# Patient Record
Sex: MALE | Race: BLACK OR AFRICAN AMERICAN | Employment: OTHER | URBAN - METROPOLITAN AREA
[De-identification: names, ages, dates, MRNs, and addresses within clinical notes are randomized per-mention and may not be internally consistent; named-entity substitution may affect disease eponyms.]

---

## 2021-05-19 ENCOUNTER — OFFICE VISIT (OUTPATIENT)
Dept: INTERNAL MEDICINE CLINIC | Facility: CLINIC | Age: 71
End: 2021-05-19
Payer: MEDICARE

## 2021-05-19 VITALS
TEMPERATURE: 97 F | DIASTOLIC BLOOD PRESSURE: 78 MMHG | SYSTOLIC BLOOD PRESSURE: 136 MMHG | BODY MASS INDEX: 21.66 KG/M2 | WEIGHT: 138 LBS | HEART RATE: 68 BPM | HEIGHT: 67 IN

## 2021-05-19 DIAGNOSIS — Z13.29 SCREENING FOR THYROID DISORDER: ICD-10-CM

## 2021-05-19 DIAGNOSIS — Z13.0 SCREENING FOR BLOOD DISEASE: ICD-10-CM

## 2021-05-19 DIAGNOSIS — F17.200 TOBACCO DEPENDENCE: ICD-10-CM

## 2021-05-19 DIAGNOSIS — Z13.228 SCREENING FOR METABOLIC DISORDER: ICD-10-CM

## 2021-05-19 DIAGNOSIS — Z00.00 ENCOUNTER FOR ANNUAL HEALTH EXAMINATION: Primary | ICD-10-CM

## 2021-05-19 DIAGNOSIS — Z12.5 SCREENING FOR PROSTATE CANCER: ICD-10-CM

## 2021-05-19 DIAGNOSIS — Z12.11 SCREEN FOR COLON CANCER: ICD-10-CM

## 2021-05-19 DIAGNOSIS — Z13.220 SCREENING FOR LIPID DISORDERS: ICD-10-CM

## 2021-05-19 PROCEDURE — 99397 PER PM REEVAL EST PAT 65+ YR: CPT | Performed by: INTERNAL MEDICINE

## 2021-05-19 PROCEDURE — 3078F DIAST BP <80 MM HG: CPT | Performed by: INTERNAL MEDICINE

## 2021-05-19 PROCEDURE — 3008F BODY MASS INDEX DOCD: CPT | Performed by: INTERNAL MEDICINE

## 2021-05-19 PROCEDURE — G0438 PPPS, INITIAL VISIT: HCPCS | Performed by: INTERNAL MEDICINE

## 2021-05-19 PROCEDURE — 96160 PT-FOCUSED HLTH RISK ASSMT: CPT | Performed by: INTERNAL MEDICINE

## 2021-05-19 PROCEDURE — 90732 PPSV23 VACC 2 YRS+ SUBQ/IM: CPT | Performed by: INTERNAL MEDICINE

## 2021-05-19 PROCEDURE — G0009 ADMIN PNEUMOCOCCAL VACCINE: HCPCS | Performed by: INTERNAL MEDICINE

## 2021-05-19 PROCEDURE — 3075F SYST BP GE 130 - 139MM HG: CPT | Performed by: INTERNAL MEDICINE

## 2021-05-19 RX ORDER — ASPIRIN 81 MG
TABLET, DELAYED RELEASE (ENTERIC COATED) ORAL
COMMUNITY

## 2021-05-19 RX ORDER — ASPIRIN 81 MG/1
81 TABLET ORAL DAILY
COMMUNITY

## 2021-05-19 NOTE — PATIENT INSTRUCTIONS
Lawrence Patel's SCREENING SCHEDULE   Tests on this list are recommended by your physician but may not be covered, or covered at this frequency, by your insurer. Please check with your insurance carrier before scheduling to verify coverage.     Patrick Kawasaki or any previous visit. No flowsheet data found. Fecal Occult Blood   Covered Annually No results found for: FOB, OCCULTSTOOL No flowsheet data found.      Barium Enema-   uncomfortable but covered  Covered but uncomfortable   Glaucoma Screening      Oph prescription benefits     Recommended Websites for Advanced Directives    [email or web address] org/publications/[email or web address]. pdf  An information packet, including necessary form from the [employer] website. http://www. idph.sta

## 2021-05-19 NOTE — PROGRESS NOTES
HPI:   Amarilis Jarrell is a 72year old male who presents for a Medicare Initial Annual Wellness visit (Once after 12 month Medicare anniversary) . The patient has been in his usual state of health. No acute concerns.      He has not undergone a forma CHOLEST, HDL, LDL, TRIG       Last Chemistry Labs:   No results found for: AST, ALT, CA, ALB, TSH, CREATSERUM, GLU     CBC  (most recent labs)   No results found for: WBC, HGB, PLT     ALLERGIES:   He has No Known Allergies.     CURRENT MEDICATIONS:   aspir stated age   Head:  Normocephalic, without obvious abnormality, atraumatic   Eyes:  Bilateral conjunctiva wnl   Ears:  Normal TM's and external ear canals, both ears   Nose: Deferred   Throat: Deferred   Neck: Supple, symmetrical, trachea midline, no adeno Future  -     COMP METABOLIC PANEL (14); Future  -     LIPID PANEL;  Future  -     TSH W REFLEX TO FREE T4; Future  -     PSA SCREEN; Future    Screen for colon cancer  -     GASTRO - INTERNAL    Tobacco dependence    Screening for blood disease  -     CBC if applicable    Flex Sigmoidoscopy Screen every 10 years No results found for this or any previous visit. No flowsheet data found. Fecal Occult Blood Annually No results found for: FOB No flowsheet data found.     Glaucoma Screening      Ophthalmology

## 2021-06-23 PROBLEM — R19.7 DIARRHEA: Status: ACTIVE | Noted: 2021-06-23

## 2021-06-24 ENCOUNTER — LAB ENCOUNTER (OUTPATIENT)
Dept: LAB | Age: 71
End: 2021-06-24
Attending: INTERNAL MEDICINE
Payer: MEDICARE

## 2021-06-24 DIAGNOSIS — Z13.228 SCREENING FOR METABOLIC DISORDER: ICD-10-CM

## 2021-06-24 DIAGNOSIS — Z13.29 SCREENING FOR THYROID DISORDER: ICD-10-CM

## 2021-06-24 DIAGNOSIS — Z12.5 SCREENING FOR PROSTATE CANCER: ICD-10-CM

## 2021-06-24 DIAGNOSIS — Z13.220 SCREENING FOR LIPID DISORDERS: ICD-10-CM

## 2021-06-24 DIAGNOSIS — R74.8 ELEVATED ALKALINE PHOSPHATASE LEVEL: ICD-10-CM

## 2021-06-24 DIAGNOSIS — R19.7 DIARRHEA, UNSPECIFIED TYPE: ICD-10-CM

## 2021-06-24 DIAGNOSIS — Z00.00 ENCOUNTER FOR ANNUAL HEALTH EXAMINATION: ICD-10-CM

## 2021-06-24 DIAGNOSIS — Z13.0 SCREENING FOR BLOOD DISEASE: ICD-10-CM

## 2021-06-24 PROCEDURE — 84075 ASSAY ALKALINE PHOSPHATASE: CPT

## 2021-06-24 PROCEDURE — 80053 COMPREHEN METABOLIC PANEL: CPT

## 2021-06-24 PROCEDURE — 83516 IMMUNOASSAY NONANTIBODY: CPT

## 2021-06-24 PROCEDURE — 84080 ASSAY ALKALINE PHOSPHATASES: CPT

## 2021-06-24 PROCEDURE — 85025 COMPLETE CBC W/AUTO DIFF WBC: CPT

## 2021-06-24 PROCEDURE — 84443 ASSAY THYROID STIM HORMONE: CPT

## 2021-06-24 PROCEDURE — 82784 ASSAY IGA/IGD/IGG/IGM EACH: CPT

## 2021-06-24 PROCEDURE — 36415 COLL VENOUS BLD VENIPUNCTURE: CPT

## 2021-06-24 PROCEDURE — 80061 LIPID PANEL: CPT

## 2021-07-16 ENCOUNTER — ANESTHESIA (OUTPATIENT)
Dept: ENDOSCOPY | Facility: HOSPITAL | Age: 71
End: 2021-07-16
Payer: MEDICARE

## 2021-07-16 ENCOUNTER — HOSPITAL ENCOUNTER (OUTPATIENT)
Facility: HOSPITAL | Age: 71
Setting detail: HOSPITAL OUTPATIENT SURGERY
Discharge: HOME | End: 2021-07-16
Attending: INTERNAL MEDICINE | Admitting: INTERNAL MEDICINE
Payer: MEDICARE

## 2021-07-16 ENCOUNTER — ANESTHESIA EVENT (OUTPATIENT)
Dept: ENDOSCOPY | Facility: HOSPITAL | Age: 71
End: 2021-07-16
Payer: MEDICARE

## 2021-07-16 VITALS
HEART RATE: 51 BPM | RESPIRATION RATE: 18 BRPM | TEMPERATURE: 97 F | OXYGEN SATURATION: 99 % | HEIGHT: 67 IN | SYSTOLIC BLOOD PRESSURE: 121 MMHG | WEIGHT: 132 LBS | BODY MASS INDEX: 20.72 KG/M2 | DIASTOLIC BLOOD PRESSURE: 63 MMHG

## 2021-07-16 DIAGNOSIS — R19.7 DIARRHEA, UNSPECIFIED TYPE: ICD-10-CM

## 2021-07-16 PROCEDURE — 88305 TISSUE EXAM BY PATHOLOGIST: CPT | Performed by: INTERNAL MEDICINE

## 2021-07-16 PROCEDURE — 0DBE8ZX EXCISION OF LARGE INTESTINE, VIA NATURAL OR ARTIFICIAL OPENING ENDOSCOPIC, DIAGNOSTIC: ICD-10-PCS | Performed by: INTERNAL MEDICINE

## 2021-07-16 RX ORDER — SODIUM CHLORIDE, SODIUM LACTATE, POTASSIUM CHLORIDE, CALCIUM CHLORIDE 600; 310; 30; 20 MG/100ML; MG/100ML; MG/100ML; MG/100ML
INJECTION, SOLUTION INTRAVENOUS CONTINUOUS
Status: DISCONTINUED | OUTPATIENT
Start: 2021-07-16 | End: 2021-07-16

## 2021-07-16 RX ORDER — LIDOCAINE HYDROCHLORIDE 10 MG/ML
INJECTION, SOLUTION EPIDURAL; INFILTRATION; INTRACAUDAL; PERINEURAL AS NEEDED
Status: DISCONTINUED | OUTPATIENT
Start: 2021-07-16 | End: 2021-07-16 | Stop reason: SURG

## 2021-07-16 RX ADMIN — LIDOCAINE HYDROCHLORIDE 50 MG: 10 INJECTION, SOLUTION EPIDURAL; INFILTRATION; INTRACAUDAL; PERINEURAL at 09:59:00

## 2021-07-16 RX ADMIN — SODIUM CHLORIDE, SODIUM LACTATE, POTASSIUM CHLORIDE, CALCIUM CHLORIDE: 600; 310; 30; 20 INJECTION, SOLUTION INTRAVENOUS at 10:11:00

## 2021-07-16 RX ADMIN — SODIUM CHLORIDE, SODIUM LACTATE, POTASSIUM CHLORIDE, CALCIUM CHLORIDE: 600; 310; 30; 20 INJECTION, SOLUTION INTRAVENOUS at 09:57:00

## 2021-07-16 NOTE — ANESTHESIA POSTPROCEDURE EVALUATION
[address] [place] [address] Patient Status:  Hospital Outpatient Surgery   Age/Gender 72year old male MRN [id number]   Location [id number] [place] [address] [facility] Day # 0 PCP Elenita Cardoso MD       Anesthesia

## 2021-07-16 NOTE — OPERATIVE REPORT
Merritt Hidalgo Patient Status:  Hospital Outpatient Surgery    1950 MRN [id number]   Location [id number] [place] [address] Attending Lisa Bills,    Hosp Day # 0 PCP Malgorzata George MD       PREOPERATIVE DIAGNOSIS/INDICATION: were normal.  Transverse colon: The mucosa and vascular pattern were normal.  Descending colon: The mucosa and vascular pattern were normal.  Sigmoid colon: The mucosa and vascular pattern were normal.  Rectum:  The mucosa and vascular pattern were normal.

## 2021-07-16 NOTE — H&P
History & Physical Examination    Patient Name: Niki Higginbotham  MRN: [id number]  [facility]: [id number]  YOB: 1950    Diagnosis: chronic diarrhea    Present Illness: 71 y/o M history as above presents for Colonoscopy.      Omega-3 Fatty Acids (FISH

## 2021-07-16 NOTE — ANESTHESIA PREPROCEDURE EVALUATION
PRE-OP EVALUATION    Patient Name: Sarita Pappas    Admit Diagnosis: Diarrhea, unspecified type [R19.7]    Pre-op Diagnosis: Diarrhea, unspecified type [R19.7]    COLONOSCOPY    Anesthesia Procedure: COLONOSCOPY (N/A )    Surgeon(s) and Role:     * marshal standard drinks      Types: 2 Standard drinks or equivalent per week      Drug use:    Types: Cannabis     Available pre-op labs reviewed.   Lab Results   Component Value Date    WBC 8.5 06/24/2021    RBC 4.37 06/24/2021    HGB 12.4 (L) 06/24/2021    HCT 40

## 2021-07-26 ENCOUNTER — ANCILLARY ORDERS (OUTPATIENT)
Dept: INTERNAL MEDICINE CLINIC | Facility: CLINIC | Age: 71
End: 2021-07-26

## 2021-07-26 ENCOUNTER — HOSPITAL ENCOUNTER (OUTPATIENT)
Dept: ULTRASOUND IMAGING | Age: 71
Discharge: HOME | End: 2021-07-26
Attending: INTERNAL MEDICINE
Payer: MEDICARE

## 2021-07-26 DIAGNOSIS — R74.8 ELEVATED ALKALINE PHOSPHATASE LEVEL: ICD-10-CM

## 2021-07-26 DIAGNOSIS — K86.89 PANCREATIC MASS: Primary | ICD-10-CM

## 2021-07-26 PROCEDURE — 76700 US EXAM ABDOM COMPLETE: CPT | Performed by: INTERNAL MEDICINE

## 2021-08-05 ENCOUNTER — TELEPHONE (OUTPATIENT)
Dept: INTERNAL MEDICINE CLINIC | Facility: CLINIC | Age: 71
End: 2021-08-05

## 2021-08-05 NOTE — TELEPHONE ENCOUNTER
Study not approved despite abnormal findings on ultrasound. Please advise follow-up with the GI service for further evaluation.

## 2021-08-05 NOTE — TELEPHONE ENCOUNTER
Additional information  Received: Today  Miryam Noriega  P [facility] Clinical Staff  Good Morning,     The following request is requiring additional supporting clinical information.      Upon review, the Medical Director is unable to approve this request for t

## 2021-08-06 NOTE — TELEPHONE ENCOUNTER
Patient notified of testing denied. Patient will pursue GI work up and and eval. Patient to call and cancel current CT appointment. Patient verb understanding and denies questions.

## 2022-11-04 ENCOUNTER — OFFICE VISIT (OUTPATIENT)
Dept: INTERNAL MEDICINE CLINIC | Facility: CLINIC | Age: 72
End: 2022-11-04
Payer: MEDICARE

## 2022-11-04 VITALS
OXYGEN SATURATION: 97 % | BODY MASS INDEX: 20.61 KG/M2 | HEART RATE: 58 BPM | SYSTOLIC BLOOD PRESSURE: 132 MMHG | WEIGHT: 136 LBS | DIASTOLIC BLOOD PRESSURE: 70 MMHG | TEMPERATURE: 98 F | RESPIRATION RATE: 18 BRPM | HEIGHT: 68 IN

## 2022-11-04 DIAGNOSIS — M79.674 PAIN AND SWELLING OF TOE OF RIGHT FOOT: ICD-10-CM

## 2022-11-04 DIAGNOSIS — Z13.220 SCREENING FOR LIPID DISORDERS: ICD-10-CM

## 2022-11-04 DIAGNOSIS — K86.89 MASS OF PANCREAS: ICD-10-CM

## 2022-11-04 DIAGNOSIS — R74.8 ELEVATED ALKALINE PHOSPHATASE LEVEL: Primary | ICD-10-CM

## 2022-11-04 DIAGNOSIS — F17.200 TOBACCO DEPENDENCE: ICD-10-CM

## 2022-11-04 DIAGNOSIS — M79.89 PAIN AND SWELLING OF TOE OF RIGHT FOOT: ICD-10-CM

## 2022-11-04 DIAGNOSIS — Z00.00 LABORATORY EXAMINATION ORDERED AS PART OF A COMPLETE PHYSICAL EXAMINATION: ICD-10-CM

## 2022-11-04 PROCEDURE — G0008 ADMIN INFLUENZA VIRUS VAC: HCPCS | Performed by: INTERNAL MEDICINE

## 2022-11-04 PROCEDURE — 99214 OFFICE O/P EST MOD 30 MIN: CPT | Performed by: INTERNAL MEDICINE

## 2022-11-04 PROCEDURE — 3075F SYST BP GE 130 - 139MM HG: CPT | Performed by: INTERNAL MEDICINE

## 2022-11-04 PROCEDURE — 3008F BODY MASS INDEX DOCD: CPT | Performed by: INTERNAL MEDICINE

## 2022-11-04 PROCEDURE — 90662 IIV NO PRSV INCREASED AG IM: CPT | Performed by: INTERNAL MEDICINE

## 2022-11-04 PROCEDURE — 3078F DIAST BP <80 MM HG: CPT | Performed by: INTERNAL MEDICINE

## 2022-11-04 RX ORDER — METHYLPREDNISOLONE 4 MG/1
TABLET ORAL
Qty: 1 EACH | Refills: 0 | Status: SHIPPED | OUTPATIENT
Start: 2022-11-04

## 2022-11-07 ENCOUNTER — TELEPHONE (OUTPATIENT)
Dept: INTERNAL MEDICINE CLINIC | Facility: CLINIC | Age: 72
End: 2022-11-07

## 2022-11-07 ENCOUNTER — LAB ENCOUNTER (OUTPATIENT)
Dept: LAB | Age: 72
End: 2022-11-07
Attending: INTERNAL MEDICINE
Payer: MEDICARE

## 2022-11-07 DIAGNOSIS — D72.9 NEUTROPHILIA: ICD-10-CM

## 2022-11-07 DIAGNOSIS — K86.89 MASS OF PANCREAS: ICD-10-CM

## 2022-11-07 DIAGNOSIS — R74.8 ELEVATED ALKALINE PHOSPHATASE LEVEL: ICD-10-CM

## 2022-11-07 DIAGNOSIS — Z00.00 LABORATORY EXAMINATION ORDERED AS PART OF A COMPLETE PHYSICAL EXAMINATION: ICD-10-CM

## 2022-11-07 DIAGNOSIS — Z13.220 SCREENING FOR LIPID DISORDERS: ICD-10-CM

## 2022-11-07 DIAGNOSIS — D72.9 NEUTROPHILIA: Primary | ICD-10-CM

## 2022-11-07 LAB
ALBUMIN SERPL-MCNC: 3.7 G/DL (ref 3.4–5)
ALBUMIN/GLOB SERPL: 0.9 {RATIO} (ref 1–2)
ALP LIVER SERPL-CCNC: 152 U/L
ALT SERPL-CCNC: 20 U/L
ANION GAP SERPL CALC-SCNC: 8 MMOL/L (ref 0–18)
AST SERPL-CCNC: 35 U/L (ref 15–37)
BASOPHILS # BLD AUTO: 0.02 X10(3) UL (ref 0–0.2)
BASOPHILS NFR BLD AUTO: 0.1 %
BILIRUB SERPL-MCNC: 0.3 MG/DL (ref 0.1–2)
BUN BLD-MCNC: 25 MG/DL (ref 7–18)
BUN/CREAT SERPL: 26.3 (ref 10–20)
CALCIUM BLD-MCNC: 9.6 MG/DL (ref 8.5–10.1)
CHLORIDE SERPL-SCNC: 106 MMOL/L (ref 98–112)
CHOLEST SERPL-MCNC: 226 MG/DL (ref ?–200)
CO2 SERPL-SCNC: 29 MMOL/L (ref 21–32)
CREAT BLD-MCNC: 0.95 MG/DL
DEPRECATED HBV CORE AB SER IA-ACNC: 130.6 NG/ML
DEPRECATED RDW RBC AUTO: 51.7 FL (ref 35.1–46.3)
EOSINOPHIL # BLD AUTO: 0 X10(3) UL (ref 0–0.7)
EOSINOPHIL NFR BLD AUTO: 0 %
ERYTHROCYTE [DISTWIDTH] IN BLOOD BY AUTOMATED COUNT: 15.4 % (ref 11–15)
FASTING PATIENT LIPID ANSWER: YES
FASTING STATUS PATIENT QL REPORTED: YES
GFR SERPLBLD BASED ON 1.73 SQ M-ARVRAT: 86 ML/MIN/1.73M2 (ref 60–?)
GLOBULIN PLAS-MCNC: 4.3 G/DL (ref 2.8–4.4)
GLUCOSE BLD-MCNC: 102 MG/DL (ref 70–99)
HCT VFR BLD AUTO: 42.2 %
HDLC SERPL-MCNC: 54 MG/DL (ref 40–59)
HGB BLD-MCNC: 13.2 G/DL
IMM GRANULOCYTES # BLD AUTO: 0.13 X10(3) UL (ref 0–1)
IMM GRANULOCYTES NFR BLD: 0.6 %
IRON SATN MFR SERPL: 31 %
IRON SERPL-MCNC: 107 UG/DL
LDLC SERPL CALC-MCNC: 157 MG/DL (ref ?–100)
LIPASE SERPL-CCNC: 146 U/L (ref 73–393)
LYMPHOCYTES # BLD AUTO: 3.03 X10(3) UL (ref 1–4)
LYMPHOCYTES NFR BLD AUTO: 14.7 %
MCH RBC QN AUTO: 28.6 PG (ref 26–34)
MCHC RBC AUTO-ENTMCNC: 31.3 G/DL (ref 31–37)
MCV RBC AUTO: 91.5 FL
MONOCYTES # BLD AUTO: 0.87 X10(3) UL (ref 0.1–1)
MONOCYTES NFR BLD AUTO: 4.2 %
NEUTROPHILS # BLD AUTO: 16.61 X10 (3) UL (ref 1.5–7.7)
NEUTROPHILS # BLD AUTO: 16.61 X10(3) UL (ref 1.5–7.7)
NEUTROPHILS NFR BLD AUTO: 80.4 %
NONHDLC SERPL-MCNC: 172 MG/DL (ref ?–130)
OSMOLALITY SERPL CALC.SUM OF ELEC: 301 MOSM/KG (ref 275–295)
PLATELET # BLD AUTO: 404 10(3)UL (ref 150–450)
POTASSIUM SERPL-SCNC: 4.9 MMOL/L (ref 3.5–5.1)
PROT SERPL-MCNC: 8 G/DL (ref 6.4–8.2)
RBC # BLD AUTO: 4.61 X10(6)UL
SODIUM SERPL-SCNC: 143 MMOL/L (ref 136–145)
TIBC SERPL-MCNC: 340 UG/DL (ref 240–450)
TRANSFERRIN SERPL-MCNC: 228 MG/DL (ref 200–360)
TRIGL SERPL-MCNC: 86 MG/DL (ref 30–149)
VLDLC SERPL CALC-MCNC: 16 MG/DL (ref 0–30)
WBC # BLD AUTO: 20.7 X10(3) UL (ref 4–11)

## 2022-11-07 PROCEDURE — 82728 ASSAY OF FERRITIN: CPT

## 2022-11-07 PROCEDURE — 80061 LIPID PANEL: CPT

## 2022-11-07 PROCEDURE — 85025 COMPLETE CBC W/AUTO DIFF WBC: CPT

## 2022-11-07 PROCEDURE — 84466 ASSAY OF TRANSFERRIN: CPT

## 2022-11-07 PROCEDURE — 83690 ASSAY OF LIPASE: CPT

## 2022-11-07 PROCEDURE — 83540 ASSAY OF IRON: CPT

## 2022-11-07 PROCEDURE — 80053 COMPREHEN METABOLIC PANEL: CPT

## 2022-11-07 PROCEDURE — 36415 COLL VENOUS BLD VENIPUNCTURE: CPT

## 2022-11-08 NOTE — TELEPHONE ENCOUNTER
Correction to lab result documentation earlier today. Neutrophilia is likely secondary to recent oral steroid use. Repeat CBC in 4-6 weeks to ensure improvement.

## 2022-11-19 ENCOUNTER — HOSPITAL ENCOUNTER (OUTPATIENT)
Age: 72
Discharge: HOME | End: 2022-11-19
Payer: MEDICARE

## 2022-11-19 ENCOUNTER — APPOINTMENT (OUTPATIENT)
Dept: GENERAL RADIOLOGY | Age: 72
End: 2022-11-19
Attending: NURSE PRACTITIONER
Payer: MEDICARE

## 2022-11-19 VITALS
BODY MASS INDEX: 19.7 KG/M2 | OXYGEN SATURATION: 99 % | WEIGHT: 130 LBS | DIASTOLIC BLOOD PRESSURE: 73 MMHG | TEMPERATURE: 99 F | HEIGHT: 68 IN | HEART RATE: 74 BPM | SYSTOLIC BLOOD PRESSURE: 133 MMHG | RESPIRATION RATE: 16 BRPM

## 2022-11-19 DIAGNOSIS — B34.9 ACUTE VIRAL SYNDROME: Primary | ICD-10-CM

## 2022-11-19 DIAGNOSIS — Z11.52 ENCOUNTER FOR SCREENING FOR COVID-19: ICD-10-CM

## 2022-11-19 DIAGNOSIS — R50.9 FEVER: ICD-10-CM

## 2022-11-19 DIAGNOSIS — R68.83 CHILLS: ICD-10-CM

## 2022-11-19 LAB
POCT INFLUENZA A: NEGATIVE
POCT INFLUENZA B: NEGATIVE
SARS-COV-2 RNA RESP QL NAA+PROBE: NOT DETECTED

## 2022-11-19 PROCEDURE — 99203 OFFICE O/P NEW LOW 30 MIN: CPT | Performed by: NURSE PRACTITIONER

## 2022-11-19 PROCEDURE — 87502 INFLUENZA DNA AMP PROBE: CPT | Performed by: NURSE PRACTITIONER

## 2022-11-19 PROCEDURE — 71046 X-RAY EXAM CHEST 2 VIEWS: CPT | Performed by: NURSE PRACTITIONER

## 2022-11-19 PROCEDURE — U0002 COVID-19 LAB TEST NON-CDC: HCPCS | Performed by: NURSE PRACTITIONER

## 2022-11-19 NOTE — ED INITIAL ASSESSMENT (HPI)
Patient reports bodyaches and fatigue since Monday, possible fever, slight cough and congestion. Denies sore throat. No vomiting. Diarrhea earlier in week.

## 2023-01-17 ENCOUNTER — LAB ENCOUNTER (OUTPATIENT)
Dept: LAB | Age: 73
End: 2023-01-17
Attending: INTERNAL MEDICINE
Payer: MEDICARE

## 2023-01-17 ENCOUNTER — HOSPITAL ENCOUNTER (OUTPATIENT)
Age: 73
Discharge: HOME | End: 2023-01-17
Payer: MEDICARE

## 2023-01-17 ENCOUNTER — APPOINTMENT (OUTPATIENT)
Dept: GENERAL RADIOLOGY | Age: 73
End: 2023-01-17
Attending: PHYSICIAN ASSISTANT
Payer: MEDICARE

## 2023-01-17 VITALS
WEIGHT: 135 LBS | SYSTOLIC BLOOD PRESSURE: 160 MMHG | TEMPERATURE: 99 F | RESPIRATION RATE: 18 BRPM | DIASTOLIC BLOOD PRESSURE: 80 MMHG | HEART RATE: 59 BPM | HEIGHT: 69 IN | OXYGEN SATURATION: 98 % | BODY MASS INDEX: 19.99 KG/M2

## 2023-01-17 DIAGNOSIS — L98.491 SKIN ULCER, LIMITED TO BREAKDOWN OF SKIN (HCC): Primary | ICD-10-CM

## 2023-01-17 DIAGNOSIS — D72.9 NEUTROPHILIA: ICD-10-CM

## 2023-01-17 DIAGNOSIS — L08.9 SKIN INFECTION: ICD-10-CM

## 2023-01-17 LAB
#MXD IC: 0.6 X10ˆ3/UL (ref 0.1–1)
BASOPHILS # BLD AUTO: 0.09 X10(3) UL (ref 0–0.2)
BASOPHILS NFR BLD AUTO: 1.2 %
BUN BLD-MCNC: 17 MG/DL (ref 7–18)
CHLORIDE BLD-SCNC: 101 MMOL/L (ref 98–112)
CO2 BLD-SCNC: 30 MMOL/L (ref 21–32)
CREAT BLD-MCNC: 0.9 MG/DL
EOSINOPHIL # BLD AUTO: 0.5 X10(3) UL (ref 0–0.7)
EOSINOPHIL NFR BLD AUTO: 6.4 %
ERYTHROCYTE [DISTWIDTH] IN BLOOD BY AUTOMATED COUNT: 14.6 %
GFR SERPLBLD BASED ON 1.73 SQ M-ARVRAT: 91 ML/MIN/1.73M2 (ref 60–?)
GLUCOSE BLD-MCNC: 93 MG/DL (ref 70–99)
HCT VFR BLD AUTO: 37.6 %
HCT VFR BLD AUTO: 38.8 %
HCT VFR BLD CALC: 39 %
HGB BLD-MCNC: 12.7 G/DL
HGB BLD-MCNC: 12.8 G/DL
IMM GRANULOCYTES # BLD AUTO: 0.01 X10(3) UL (ref 0–1)
IMM GRANULOCYTES NFR BLD: 0.1 %
ISTAT IONIZED CALCIUM FOR CHEM 8: 1.12 MMOL/L (ref 1.12–1.32)
LYMPHOCYTES # BLD AUTO: 3.6 X10ˆ3/UL (ref 1–4)
LYMPHOCYTES # BLD AUTO: 3.63 X10(3) UL (ref 1–4)
LYMPHOCYTES NFR BLD AUTO: 44.4 %
LYMPHOCYTES NFR BLD AUTO: 46.6 %
MCH RBC QN AUTO: 29.4 PG (ref 26–34)
MCH RBC QN AUTO: 30.2 PG (ref 26–34)
MCHC RBC AUTO-ENTMCNC: 33 G/DL (ref 31–37)
MCHC RBC AUTO-ENTMCNC: 33.8 G/DL (ref 31–37)
MCV RBC AUTO: 89.2 FL (ref 80–100)
MCV RBC AUTO: 89.5 FL
MIXED CELL %: 7.7 %
MONOCYTES # BLD AUTO: 0.45 X10(3) UL (ref 0.1–1)
MONOCYTES NFR BLD AUTO: 5.8 %
NEUTROPHILS # BLD AUTO: 3.11 X10 (3) UL (ref 1.5–7.7)
NEUTROPHILS # BLD AUTO: 3.11 X10(3) UL (ref 1.5–7.7)
NEUTROPHILS # BLD AUTO: 4 X10ˆ3/UL (ref 1.5–7.7)
NEUTROPHILS NFR BLD AUTO: 39.9 %
NEUTROPHILS NFR BLD AUTO: 47.9 %
PLATELET # BLD AUTO: 298 10(3)UL (ref 150–450)
PLATELET # BLD AUTO: 307 X10ˆ3/UL (ref 150–450)
POTASSIUM BLD-SCNC: 3.9 MMOL/L (ref 3.6–5.1)
RBC # BLD AUTO: 4.2 X10(6)UL
RBC # BLD AUTO: 4.35 X10ˆ6/UL
SODIUM BLD-SCNC: 139 MMOL/L (ref 136–145)
WBC # BLD AUTO: 7.8 X10(3) UL (ref 4–11)
WBC # BLD AUTO: 8.2 X10ˆ3/UL (ref 4–11)

## 2023-01-17 PROCEDURE — 80047 BASIC METABLC PNL IONIZED CA: CPT | Performed by: PHYSICIAN ASSISTANT

## 2023-01-17 PROCEDURE — 85025 COMPLETE CBC W/AUTO DIFF WBC: CPT | Performed by: PHYSICIAN ASSISTANT

## 2023-01-17 PROCEDURE — 73630 X-RAY EXAM OF FOOT: CPT | Performed by: PHYSICIAN ASSISTANT

## 2023-01-17 PROCEDURE — 99214 OFFICE O/P EST MOD 30 MIN: CPT | Performed by: PHYSICIAN ASSISTANT

## 2023-01-17 PROCEDURE — 96366 THER/PROPH/DIAG IV INF ADDON: CPT | Performed by: PHYSICIAN ASSISTANT

## 2023-01-17 PROCEDURE — 96365 THER/PROPH/DIAG IV INF INIT: CPT | Performed by: PHYSICIAN ASSISTANT

## 2023-01-17 RX ORDER — KETOROLAC TROMETHAMINE 30 MG/ML
15 INJECTION, SOLUTION INTRAMUSCULAR; INTRAVENOUS ONCE
Status: COMPLETED | OUTPATIENT
Start: 2023-01-17 | End: 2023-01-17

## 2023-01-17 RX ORDER — CEPHALEXIN 500 MG/1
500 CAPSULE ORAL 4 TIMES DAILY
Qty: 40 CAPSULE | Refills: 0 | Status: SHIPPED | OUTPATIENT
Start: 2023-01-17 | End: 2023-01-27

## 2023-01-17 NOTE — ED INITIAL ASSESSMENT (HPI)
Pt here w/ areas of sores to right foot. States had red toes in November and given steroids after told it was gout.  Then the wounds began

## 2023-01-17 NOTE — DISCHARGE INSTRUCTIONS
Please return to the ER/clinic if symptoms worsen. Follow-up with your PCP in 24-48 hours as needed. 1) Take the full course of antibiotics as prescribed in tandem with a probiotic daily. Where is the antibiotics kill the bad bacteria they also kill the good gut aston. Some good over-the-counter brands are align, Florastor and Culturelle    2) apply the mupirocin ointment on the infected areas. Then I recommend a good over-the-counter moisturizer i.e. Cetaphil for dry skin or CeraVe you for dry skin. On top of that I would apply a layer of Aquaphor. Then wear some warm socks. Recommend sleeping with like that and also keeping the legs elevated for better perfusion. 3) call your primary care physician if you need a referral or go straight to vascular for further evaluation and treatment. Anything changes in the meantime i.e. increasing discoloration pain swelling shortness of breath or fevers go directly to the emergency room. Otherwise follow the steps above.

## 2023-01-17 NOTE — ED QUICK NOTES
Pt w/ multiple areas of sores noted. Noted to have some purplish coloration to toes. Pedal pulse palpable.

## 2023-02-16 ENCOUNTER — TELEPHONE (OUTPATIENT)
Dept: INTERNAL MEDICINE CLINIC | Facility: CLINIC | Age: 73
End: 2023-02-16

## 2023-02-16 NOTE — TELEPHONE ENCOUNTER
Supervisit   Future Appointments   Date Time Provider Jacinda Azra   4/4/2023  2:20 PM Avery Rios MD [facility]      Informed must fast no call back required.  Orders to    [employer] stated he had blood work already to double check if he needs anything additional.

## 2023-03-31 ENCOUNTER — TELEPHONE (OUTPATIENT)
Dept: INTERNAL MEDICINE CLINIC | Facility: CLINIC | Age: 73
End: 2023-03-31

## 2023-03-31 NOTE — TELEPHONE ENCOUNTER
Patient was seen in [address] [place] [address] back in January for his foot, foot seems to be getting worse and he is getting more ulcers .   Patient is looking for a recommendation for a wound clinic and because of his INS will need a referral.

## 2023-03-31 NOTE — TELEPHONE ENCOUNTER
LOV 11/4/22    Spoke to pt. States he had some foot ulcers in Jan and was seen at [address] [place] [address]. They improved with treatment and started to worsen again later Feb. States now his big toe is \"splitting\", he has these ulcers and little \"cuts\" all over and it makes it difficult to walk. Has some mild redness/swellling. Denies drainage or blood. No fever. In Jan, IC recommended he f/u with vasc surgery. At that time, he required antibiotics- looks like he was given IV rocephin, oral abx, and topical. He did not f/u with vasc surg. Should we start with appt here to evaluate? Has MA Supervisit 4/4.  LEO

## 2023-04-04 ENCOUNTER — OFFICE VISIT (OUTPATIENT)
Dept: INTERNAL MEDICINE CLINIC | Facility: CLINIC | Age: 73
End: 2023-04-04
Payer: MEDICARE

## 2023-04-04 VITALS
RESPIRATION RATE: 18 BRPM | OXYGEN SATURATION: 98 % | HEIGHT: 69 IN | BODY MASS INDEX: 21.8 KG/M2 | SYSTOLIC BLOOD PRESSURE: 128 MMHG | DIASTOLIC BLOOD PRESSURE: 82 MMHG | WEIGHT: 147.19 LBS | HEART RATE: 59 BPM

## 2023-04-04 DIAGNOSIS — Z13.220 SCREENING FOR LIPID DISORDERS: ICD-10-CM

## 2023-04-04 DIAGNOSIS — K86.89 MASS OF PANCREAS: ICD-10-CM

## 2023-04-04 DIAGNOSIS — M79.671 CHRONIC PAIN IN RIGHT FOOT: ICD-10-CM

## 2023-04-04 DIAGNOSIS — F17.200 TOBACCO DEPENDENCE: ICD-10-CM

## 2023-04-04 DIAGNOSIS — R74.8 ELEVATED ALKALINE PHOSPHATASE LEVEL: ICD-10-CM

## 2023-04-04 DIAGNOSIS — Z00.00 ENCOUNTER FOR ANNUAL HEALTH EXAMINATION: Primary | ICD-10-CM

## 2023-04-04 DIAGNOSIS — G89.29 CHRONIC PAIN IN RIGHT FOOT: ICD-10-CM

## 2023-04-04 DIAGNOSIS — Z12.5 PROSTATE CANCER SCREENING: ICD-10-CM

## 2023-04-04 PROCEDURE — 3008F BODY MASS INDEX DOCD: CPT | Performed by: INTERNAL MEDICINE

## 2023-04-04 PROCEDURE — 99213 OFFICE O/P EST LOW 20 MIN: CPT | Performed by: INTERNAL MEDICINE

## 2023-04-04 PROCEDURE — 1125F AMNT PAIN NOTED PAIN PRSNT: CPT | Performed by: INTERNAL MEDICINE

## 2023-04-04 PROCEDURE — 3074F SYST BP LT 130 MM HG: CPT | Performed by: INTERNAL MEDICINE

## 2023-04-04 PROCEDURE — G0439 PPPS, SUBSEQ VISIT: HCPCS | Performed by: INTERNAL MEDICINE

## 2023-04-04 PROCEDURE — 3079F DIAST BP 80-89 MM HG: CPT | Performed by: INTERNAL MEDICINE

## 2023-04-04 PROCEDURE — 96160 PT-FOCUSED HLTH RISK ASSMT: CPT | Performed by: INTERNAL MEDICINE

## 2023-04-13 ENCOUNTER — LAB ENCOUNTER (OUTPATIENT)
Dept: LAB | Age: 73
End: 2023-04-13
Attending: INTERNAL MEDICINE
Payer: MEDICARE

## 2023-04-13 DIAGNOSIS — R74.8 ELEVATED ALKALINE PHOSPHATASE LEVEL: ICD-10-CM

## 2023-04-13 DIAGNOSIS — Z13.220 SCREENING FOR LIPID DISORDERS: ICD-10-CM

## 2023-04-13 DIAGNOSIS — E78.00 PURE HYPERCHOLESTEROLEMIA: ICD-10-CM

## 2023-04-13 DIAGNOSIS — D64.9 NORMOCYTIC ANEMIA: ICD-10-CM

## 2023-04-13 DIAGNOSIS — Z00.00 ENCOUNTER FOR ANNUAL HEALTH EXAMINATION: ICD-10-CM

## 2023-04-13 DIAGNOSIS — M79.671 CHRONIC PAIN IN RIGHT FOOT: ICD-10-CM

## 2023-04-13 DIAGNOSIS — Z12.5 PROSTATE CANCER SCREENING: ICD-10-CM

## 2023-04-13 DIAGNOSIS — G89.29 CHRONIC PAIN IN RIGHT FOOT: ICD-10-CM

## 2023-04-13 DIAGNOSIS — D64.9 NORMOCYTIC ANEMIA: Primary | ICD-10-CM

## 2023-04-13 LAB
ALBUMIN SERPL-MCNC: 3.4 G/DL (ref 3.4–5)
ALBUMIN/GLOB SERPL: 0.8 {RATIO} (ref 1–2)
ALP LIVER SERPL-CCNC: 137 U/L
ALT SERPL-CCNC: 21 U/L
ANION GAP SERPL CALC-SCNC: 5 MMOL/L (ref 0–18)
AST SERPL-CCNC: 42 U/L (ref 15–37)
BASOPHILS # BLD AUTO: 0.08 X10(3) UL (ref 0–0.2)
BASOPHILS NFR BLD AUTO: 0.9 %
BILIRUB SERPL-MCNC: 0.6 MG/DL (ref 0.1–2)
BUN BLD-MCNC: 14 MG/DL (ref 7–18)
CALCIUM BLD-MCNC: 9 MG/DL (ref 8.5–10.1)
CHLORIDE SERPL-SCNC: 102 MMOL/L (ref 98–112)
CHOLEST SERPL-MCNC: 196 MG/DL (ref ?–200)
CO2 SERPL-SCNC: 28 MMOL/L (ref 21–32)
COMPLEXED PSA SERPL-MCNC: 0.87 NG/ML (ref ?–4)
CREAT BLD-MCNC: 0.99 MG/DL
EOSINOPHIL # BLD AUTO: 0.42 X10(3) UL (ref 0–0.7)
EOSINOPHIL NFR BLD AUTO: 4.6 %
ERYTHROCYTE [DISTWIDTH] IN BLOOD BY AUTOMATED COUNT: 13.4 %
FASTING PATIENT LIPID ANSWER: YES
FASTING STATUS PATIENT QL REPORTED: YES
GFR SERPLBLD BASED ON 1.73 SQ M-ARVRAT: 81 ML/MIN/1.73M2 (ref 60–?)
GLOBULIN PLAS-MCNC: 4.2 G/DL (ref 2.8–4.4)
GLUCOSE BLD-MCNC: 83 MG/DL (ref 70–99)
HCT VFR BLD AUTO: 39 %
HDLC SERPL-MCNC: 37 MG/DL (ref 40–59)
HGB BLD-MCNC: 12.8 G/DL
IMM GRANULOCYTES # BLD AUTO: 0.03 X10(3) UL (ref 0–1)
IMM GRANULOCYTES NFR BLD: 0.3 %
LDLC SERPL CALC-MCNC: 134 MG/DL (ref ?–100)
LYMPHOCYTES # BLD AUTO: 3.9 X10(3) UL (ref 1–4)
LYMPHOCYTES NFR BLD AUTO: 42.9 %
MCH RBC QN AUTO: 29.6 PG (ref 26–34)
MCHC RBC AUTO-ENTMCNC: 32.8 G/DL (ref 31–37)
MCV RBC AUTO: 90.1 FL
MONOCYTES # BLD AUTO: 0.99 X10(3) UL (ref 0.1–1)
MONOCYTES NFR BLD AUTO: 10.9 %
NEUTROPHILS # BLD AUTO: 3.68 X10 (3) UL (ref 1.5–7.7)
NEUTROPHILS # BLD AUTO: 3.68 X10(3) UL (ref 1.5–7.7)
NEUTROPHILS NFR BLD AUTO: 40.4 %
NONHDLC SERPL-MCNC: 159 MG/DL (ref ?–130)
OSMOLALITY SERPL CALC.SUM OF ELEC: 280 MOSM/KG (ref 275–295)
PLATELET # BLD AUTO: 290 10(3)UL (ref 150–450)
POTASSIUM SERPL-SCNC: 3.8 MMOL/L (ref 3.5–5.1)
PROT SERPL-MCNC: 7.6 G/DL (ref 6.4–8.2)
RBC # BLD AUTO: 4.33 X10(6)UL
SODIUM SERPL-SCNC: 135 MMOL/L (ref 136–145)
TRIGL SERPL-MCNC: 136 MG/DL (ref 30–149)
URATE SERPL-MCNC: 4.8 MG/DL
VIT B12 SERPL-MCNC: 503 PG/ML (ref 193–986)
VLDLC SERPL CALC-MCNC: 25 MG/DL (ref 0–30)
WBC # BLD AUTO: 9.1 X10(3) UL (ref 4–11)

## 2023-04-13 PROCEDURE — 84550 ASSAY OF BLOOD/URIC ACID: CPT

## 2023-04-13 PROCEDURE — 85025 COMPLETE CBC W/AUTO DIFF WBC: CPT

## 2023-04-13 PROCEDURE — 82607 VITAMIN B-12: CPT

## 2023-04-13 PROCEDURE — 80061 LIPID PANEL: CPT

## 2023-04-13 PROCEDURE — 80053 COMPREHEN METABOLIC PANEL: CPT

## 2023-04-13 PROCEDURE — 36415 COLL VENOUS BLD VENIPUNCTURE: CPT

## 2023-05-11 ENCOUNTER — OFFICE VISIT (OUTPATIENT)
Dept: INTERNAL MEDICINE CLINIC | Facility: CLINIC | Age: 73
End: 2023-05-11
Payer: MEDICARE

## 2023-05-11 VITALS
HEART RATE: 67 BPM | SYSTOLIC BLOOD PRESSURE: 138 MMHG | DIASTOLIC BLOOD PRESSURE: 82 MMHG | RESPIRATION RATE: 18 BRPM | OXYGEN SATURATION: 98 % | WEIGHT: 138.81 LBS | BODY MASS INDEX: 20.56 KG/M2 | HEIGHT: 69 IN

## 2023-05-11 DIAGNOSIS — F17.200 TOBACCO DEPENDENCE: ICD-10-CM

## 2023-05-11 DIAGNOSIS — I73.9 CLAUDICATION (HCC): Primary | ICD-10-CM

## 2023-05-11 DIAGNOSIS — L98.9 SKIN LESION OF FOOT: ICD-10-CM

## 2023-05-11 PROCEDURE — 3079F DIAST BP 80-89 MM HG: CPT | Performed by: PHYSICIAN ASSISTANT

## 2023-05-11 PROCEDURE — 3008F BODY MASS INDEX DOCD: CPT | Performed by: PHYSICIAN ASSISTANT

## 2023-05-11 PROCEDURE — 99214 OFFICE O/P EST MOD 30 MIN: CPT | Performed by: PHYSICIAN ASSISTANT

## 2023-05-11 PROCEDURE — 1170F FXNL STATUS ASSESSED: CPT | Performed by: PHYSICIAN ASSISTANT

## 2023-05-11 PROCEDURE — 1159F MED LIST DOCD IN RCRD: CPT | Performed by: PHYSICIAN ASSISTANT

## 2023-05-11 PROCEDURE — 3075F SYST BP GE 130 - 139MM HG: CPT | Performed by: PHYSICIAN ASSISTANT

## 2023-05-19 ENCOUNTER — HOSPITAL ENCOUNTER (OUTPATIENT)
Dept: ULTRASOUND IMAGING | Facility: HOSPITAL | Age: 73
Discharge: HOME | End: 2023-05-19
Attending: PHYSICIAN ASSISTANT
Payer: MEDICARE

## 2023-05-19 DIAGNOSIS — I73.9 CLAUDICATION (HCC): ICD-10-CM

## 2023-05-19 DIAGNOSIS — L98.9 SKIN LESION OF FOOT: ICD-10-CM

## 2023-05-19 PROCEDURE — 93925 LOWER EXTREMITY STUDY: CPT | Performed by: PHYSICIAN ASSISTANT

## 2023-05-23 DIAGNOSIS — I73.9 CLAUDICATION (HCC): ICD-10-CM

## 2023-05-23 DIAGNOSIS — I73.9 PAD (PERIPHERAL ARTERY DISEASE) (HCC): Primary | ICD-10-CM

## 2023-06-12 VITALS — HEIGHT: 69 IN | BODY MASS INDEX: 19.99 KG/M2 | WEIGHT: 135 LBS

## 2023-06-13 ENCOUNTER — HOSPITAL ENCOUNTER (OUTPATIENT)
Dept: INTERVENTIONAL RADIOLOGY/VASCULAR | Facility: HOSPITAL | Age: 73
Discharge: HOME | End: 2023-06-13
Attending: SURGERY
Payer: MEDICARE

## 2023-06-15 ENCOUNTER — HOSPITAL ENCOUNTER (OUTPATIENT)
Dept: INTERVENTIONAL RADIOLOGY/VASCULAR | Facility: HOSPITAL | Age: 73
Discharge: HOME | End: 2023-06-15
Attending: SURGERY | Admitting: SURGERY
Payer: MEDICARE

## 2023-06-15 VITALS
OXYGEN SATURATION: 100 % | TEMPERATURE: 97 F | HEART RATE: 56 BPM | DIASTOLIC BLOOD PRESSURE: 72 MMHG | SYSTOLIC BLOOD PRESSURE: 175 MMHG | RESPIRATION RATE: 16 BRPM

## 2023-06-15 DIAGNOSIS — I70.235 ATHEROSCLEROSIS OF NATIVE ARTERIES OF RIGHT LEG WITH ULCERATION OF OTHER PART OF FOOT (HCC): ICD-10-CM

## 2023-06-15 PROCEDURE — 99153 MOD SED SAME PHYS/QHP EA: CPT | Performed by: SURGERY

## 2023-06-15 PROCEDURE — 99152 MOD SED SAME PHYS/QHP 5/>YRS: CPT | Performed by: SURGERY

## 2023-06-15 PROCEDURE — B41F1ZZ FLUOROSCOPY OF RIGHT LOWER EXTREMITY ARTERIES USING LOW OSMOLAR CONTRAST: ICD-10-PCS | Performed by: SURGERY

## 2023-06-15 PROCEDURE — 75710 ARTERY X-RAYS ARM/LEG: CPT | Performed by: SURGERY

## 2023-06-15 PROCEDURE — 36246 INS CATH ABD/L-EXT ART 2ND: CPT | Performed by: SURGERY

## 2023-06-15 RX ORDER — MIDAZOLAM HYDROCHLORIDE 1 MG/ML
INJECTION INTRAMUSCULAR; INTRAVENOUS
Status: COMPLETED
Start: 2023-06-15 | End: 2023-06-15

## 2023-06-15 RX ORDER — IODIXANOL 320 MG/ML
100 INJECTION, SOLUTION INTRAVASCULAR
Status: COMPLETED | OUTPATIENT
Start: 2023-06-15 | End: 2023-06-15

## 2023-06-15 RX ORDER — SODIUM CHLORIDE 9 MG/ML
INJECTION, SOLUTION INTRAVENOUS CONTINUOUS
Status: DISCONTINUED | OUTPATIENT
Start: 2023-06-15 | End: 2023-06-15

## 2023-06-15 RX ORDER — HYDRALAZINE HYDROCHLORIDE 20 MG/ML
INJECTION INTRAMUSCULAR; INTRAVENOUS
Status: COMPLETED
Start: 2023-06-15 | End: 2023-06-15

## 2023-06-15 RX ORDER — HEPARIN SODIUM 5000 [USP'U]/ML
INJECTION, SOLUTION INTRAVENOUS; SUBCUTANEOUS
Status: COMPLETED
Start: 2023-06-15 | End: 2023-06-15

## 2023-06-15 RX ORDER — LIDOCAINE HYDROCHLORIDE 10 MG/ML
INJECTION, SOLUTION EPIDURAL; INFILTRATION; INTRACAUDAL; PERINEURAL
Status: COMPLETED
Start: 2023-06-15 | End: 2023-06-15

## 2023-06-15 RX ADMIN — IODIXANOL 30 ML: 320 INJECTION, SOLUTION INTRAVASCULAR at 16:06:00

## 2023-06-15 NOTE — BRIEF OP NOTE
Pre-Operative Diagnosis: Atherosclerosis with ulcer right first toe     Post-Operative Diagnosis: same     Procedure Performed:   1. US percutaneous access left common femoral artery  2. Selection of right common iliac artery and angiogram right leg    Gaffud    Anes):   4 V 50 F start 1534, finish 1549     Surgical Findings:   1. Right external iliac artery stenosis  2.  Right common femoral artery and profunda patent, SFA to mid popliteal occluded with distal popliteal reconstitution with peroneal and anterior tibial artery runoff to foot      Specimen: none     Estimated Blood Loss: 20 mL     Ann-Marie Yoon MD  6/15/2023  3:59 PM

## 2023-06-15 NOTE — IVS NOTE
Patient discharged home post PV angio in stable condition. Patient able to ambulate in hallway without difficulty. Dressing to left groin clean, dry and intact. AVS reviewed. PIV removed. Patient discharged via wheelchair with all belongings.

## 2023-06-15 NOTE — PROGRESS NOTES
Pt s/p PV angio via L femoral artery with Mynx closure device and femstop placed to site. VSS. Pt denied pain or numbness/tingling to BLE. Tolerated PO intake. Discharge instructions reviewed with pt-pt and wife verbalized understanding.  Report given to [employer]

## 2023-06-25 NOTE — PROCEDURES
[facility]    PATIENT'S NAME: Johan Eden   ATTENDING PHYSICIAN: Carlos Nieves M.D. OPERATING PHYSICIAN: Carlos Nieves M.D. PATIENT ACCOUNT#:   [de-identified]    LOCATION:  [address]  MEDICAL RECORD #:   [id number]       YOB: 1950  ADMISSION DATE:       06/15/2023      OPERATION DATE:  06/15/2023    CARDIAC PROCEDURE TRANSCRIPTION      PERIPHERAL ANGIOGRAPHY:      PREOPERATIVE DIAGNOSIS:  Atherosclerosis with ulcer right first toe. POSTOPERATIVE DIAGNOSIS:  Atherosclerosis with ulcer right first toe. PROCEDURE PERFORMED:    1. Ultrasound-guided percutaneous access, left common femoral artery. 2.   Selection of the right common iliac artery and angiogram of the right lower extremity. ANESTHESIA:  Moderate conscious sedation with 4 mg of Versed and 50 mcg of fentanyl. Start time was 1534, finish was , for a total of 15 minutes. SURGICAL FINDINGS:  High-grade right external iliac artery stenosis approximately 80%. Right common femoral artery and profunda patent. Superficial femoral artery to mid popliteal artery occluded with distal popliteal artery reconstitution with peroneal and anterior tibial artery runoff to the foot. SPECIMEN:  None. ESTIMATED BLOOD LOSS:  20 mL. BRIEF HISTORY:  This is a 81-year-old male with significant history of atherosclerosis who for the past 7 months has had an ulcer on his right first toe, and he initially started having claudication type symptoms. We are proceeding with angiogram as described below. DETAILS OF PROCEDURE:  Patient was taken to the catheterization lab, prepped and draped in the usual sterile fashion. Moderate conscious sedation was initiated, monitored by a qualified nurse under my supervision. Using ultrasound, I percutaneously accessed the left common femoral artery with micropuncture kit.   I then advanced a Glidewire Advantage into the aorta and then exchanged the micropuncture kit for a 5-[place] sheath. I then hooked the aortic bifurcation and did an angiogram of the right common iliac and external iliac artery. The right common iliac artery was patent. The hypogastric was patent, but there was a high-grade stenosis within the external iliac artery. Therefore, from this position, I did not want to manipulate it as I suspect he needs a lower extremity bypass, and therefore concluded the diagnostic angiogram from my position at the common iliac artery. I then did the angiogram from the position of the right common iliac artery. The right external iliac high-grade stenosis was approximately 80%. There right common femoral artery and profunda were patent. The SFA was occluded from its origin to the mid popliteal artery with the distal popliteal artery reconstitution with the peroneal and anterior tibial artery runoff to the foot. Having completed the diagnostic angiogram, I then removed all devices and placed a Mynx closure device. Patient was then awakened from anesthesia and taken to Recovery in stable condition.     Dictated By Carlos King M.D.  d: 06/25/2023 11:04:06  t: 06/25/2023 11:50:38  [facility] [id number]  [facility]/

## 2023-06-25 NOTE — H&P
Pre-op Diagnosis: atherosclerosis with ulcer    The above referenced H&P was reviewed by Epi Ruvalcaba MD on 6/15/2023, the patient was examined and no significant changes have occurred in the patient's condition since the H&P was performed. I discussed with the patient and/or legal representative the potential benefits, risks and side effects of this procedure; the likelihood of the patient achieving goals; and potential problems that might occur during recuperation. I discussed reasonable alternatives to the procedure, including risks, benefits and side effects related to the alternatives and risks related to not receiving this procedure. We will proceed with procedure as planned.

## 2023-07-03 ENCOUNTER — LAB ENCOUNTER (OUTPATIENT)
Dept: LAB | Facility: HOSPITAL | Age: 73
End: 2023-07-03
Attending: SURGERY
Payer: MEDICARE

## 2023-07-03 ENCOUNTER — HOSPITAL ENCOUNTER (OUTPATIENT)
Dept: CV DIAGNOSTICS | Facility: HOSPITAL | Age: 73
Discharge: HOME | DRG: 253 | End: 2023-07-03
Attending: SURGERY
Payer: MEDICARE

## 2023-07-03 ENCOUNTER — LAB ENCOUNTER (OUTPATIENT)
Dept: LAB | Facility: HOSPITAL | Age: 73
DRG: 253 | End: 2023-07-03
Attending: SURGERY
Payer: MEDICARE

## 2023-07-03 ENCOUNTER — HOSPITAL ENCOUNTER (OUTPATIENT)
Dept: CV DIAGNOSTICS | Facility: HOSPITAL | Age: 73
Discharge: HOME | End: 2023-07-03
Attending: SURGERY
Payer: MEDICARE

## 2023-07-03 ENCOUNTER — EKG ENCOUNTER (OUTPATIENT)
Dept: LAB | Facility: HOSPITAL | Age: 73
End: 2023-07-03
Attending: SURGERY

## 2023-07-03 DIAGNOSIS — Z91.89 AT RISK FOR BLEEDING: ICD-10-CM

## 2023-07-03 DIAGNOSIS — Z01.818 PRE-OP EXAM: Primary | ICD-10-CM

## 2023-07-03 DIAGNOSIS — I70.235 ATHEROSCLEROSIS OF NATIVE ARTERY OF RIGHT LOWER EXTREMITY WITH ULCERATION OF OTHER PART OF FOOT (HCC): ICD-10-CM

## 2023-07-03 DIAGNOSIS — Z01.818 PRE-OP TESTING: ICD-10-CM

## 2023-07-03 DIAGNOSIS — E78.00 PURE HYPERCHOLESTEROLEMIA: ICD-10-CM

## 2023-07-03 DIAGNOSIS — D64.9 NORMOCYTIC ANEMIA: ICD-10-CM

## 2023-07-03 LAB
ALBUMIN SERPL-MCNC: 3.3 G/DL (ref 3.4–5)
ALBUMIN/GLOB SERPL: 0.8 {RATIO} (ref 1–2)
ALP LIVER SERPL-CCNC: 173 U/L
ALT SERPL-CCNC: 23 U/L
ANION GAP SERPL CALC-SCNC: 4 MMOL/L (ref 0–18)
ANTIBODY SCREEN: NEGATIVE
AST SERPL-CCNC: 51 U/L (ref 15–37)
BASOPHILS # BLD AUTO: 0.09 X10(3) UL (ref 0–0.2)
BASOPHILS NFR BLD AUTO: 1.2 %
BILIRUB SERPL-MCNC: 0.3 MG/DL (ref 0.1–2)
BUN BLD-MCNC: 14 MG/DL (ref 7–18)
CALCIUM BLD-MCNC: 9 MG/DL (ref 8.5–10.1)
CHLORIDE SERPL-SCNC: 104 MMOL/L (ref 98–112)
CHOLEST SERPL-MCNC: 198 MG/DL (ref ?–200)
CO2 SERPL-SCNC: 28 MMOL/L (ref 21–32)
CREAT BLD-MCNC: 0.89 MG/DL
EOSINOPHIL # BLD AUTO: 0.39 X10(3) UL (ref 0–0.7)
EOSINOPHIL NFR BLD AUTO: 5.4 %
ERYTHROCYTE [DISTWIDTH] IN BLOOD BY AUTOMATED COUNT: 14.5 %
FASTING PATIENT LIPID ANSWER: YES
FASTING STATUS PATIENT QL REPORTED: YES
GFR SERPLBLD BASED ON 1.73 SQ M-ARVRAT: 92 ML/MIN/1.73M2 (ref 60–?)
GLOBULIN PLAS-MCNC: 4.3 G/DL (ref 2.8–4.4)
GLUCOSE BLD-MCNC: 111 MG/DL (ref 70–99)
HCT VFR BLD AUTO: 37.2 %
HDLC SERPL-MCNC: 35 MG/DL (ref 40–59)
HGB BLD-MCNC: 12.3 G/DL
IMM GRANULOCYTES # BLD AUTO: 0.03 X10(3) UL (ref 0–1)
IMM GRANULOCYTES NFR BLD: 0.4 %
LDLC SERPL CALC-MCNC: 119 MG/DL (ref ?–100)
LYMPHOCYTES # BLD AUTO: 3.63 X10(3) UL (ref 1–4)
LYMPHOCYTES NFR BLD AUTO: 49.9 %
MCH RBC QN AUTO: 28.7 PG (ref 26–34)
MCHC RBC AUTO-ENTMCNC: 33.1 G/DL (ref 31–37)
MCV RBC AUTO: 86.9 FL
MONOCYTES # BLD AUTO: 0.49 X10(3) UL (ref 0.1–1)
MONOCYTES NFR BLD AUTO: 6.7 %
NEUTROPHILS # BLD AUTO: 2.65 X10 (3) UL (ref 1.5–7.7)
NEUTROPHILS # BLD AUTO: 2.65 X10(3) UL (ref 1.5–7.7)
NEUTROPHILS NFR BLD AUTO: 36.4 %
NONHDLC SERPL-MCNC: 163 MG/DL (ref ?–130)
OSMOLALITY SERPL CALC.SUM OF ELEC: 283 MOSM/KG (ref 275–295)
PLATELET # BLD AUTO: 373 10(3)UL (ref 150–450)
POTASSIUM SERPL-SCNC: 4.1 MMOL/L (ref 3.5–5.1)
PROT SERPL-MCNC: 7.6 G/DL (ref 6.4–8.2)
RBC # BLD AUTO: 4.28 X10(6)UL
RH BLOOD TYPE: POSITIVE
SODIUM SERPL-SCNC: 136 MMOL/L (ref 136–145)
TRIGL SERPL-MCNC: 252 MG/DL (ref 30–149)
VLDLC SERPL CALC-MCNC: 45 MG/DL (ref 0–30)
WBC # BLD AUTO: 7.3 X10(3) UL (ref 4–11)

## 2023-07-03 PROCEDURE — 85025 COMPLETE CBC W/AUTO DIFF WBC: CPT

## 2023-07-03 PROCEDURE — 93005 ELECTROCARDIOGRAM TRACING: CPT

## 2023-07-03 PROCEDURE — 80053 COMPREHEN METABOLIC PANEL: CPT

## 2023-07-03 PROCEDURE — 86901 BLOOD TYPING SEROLOGIC RH(D): CPT

## 2023-07-03 PROCEDURE — 80061 LIPID PANEL: CPT

## 2023-07-03 PROCEDURE — 87635 SARS-COV-2 COVID-19 AMP PRB: CPT

## 2023-07-03 PROCEDURE — 86900 BLOOD TYPING SEROLOGIC ABO: CPT

## 2023-07-03 PROCEDURE — 93010 ELECTROCARDIOGRAM REPORT: CPT | Performed by: INTERNAL MEDICINE

## 2023-07-03 PROCEDURE — 36415 COLL VENOUS BLD VENIPUNCTURE: CPT

## 2023-07-03 PROCEDURE — 86850 RBC ANTIBODY SCREEN: CPT

## 2023-07-04 LAB
ATRIAL RATE: 57 BPM
P AXIS: 48 DEGREES
P-R INTERVAL: 152 MS
Q-T INTERVAL: 458 MS
QRS DURATION: 104 MS
QTC CALCULATION (BEZET): 445 MS
R AXIS: 11 DEGREES
SARS-COV-2 RNA RESP QL NAA+PROBE: NOT DETECTED
T AXIS: 2 DEGREES
VENTRICULAR RATE: 57 BPM

## 2023-07-05 ENCOUNTER — HOSPITAL ENCOUNTER (INPATIENT)
Facility: HOSPITAL | Age: 73
LOS: 3 days | Discharge: HOME | End: 2023-07-08
Attending: SURGERY | Admitting: SURGERY
Payer: MEDICARE

## 2023-07-05 ENCOUNTER — ANESTHESIA (OUTPATIENT)
Dept: CARDIAC SURGERY | Facility: HOSPITAL | Age: 73
End: 2023-07-05
Payer: MEDICARE

## 2023-07-05 ENCOUNTER — ANESTHESIA EVENT (OUTPATIENT)
Dept: CARDIAC SURGERY | Facility: HOSPITAL | Age: 73
End: 2023-07-05
Payer: MEDICARE

## 2023-07-05 ENCOUNTER — HOSPITAL ENCOUNTER (INPATIENT)
Facility: HOSPITAL | Age: 73
LOS: 3 days | Discharge: HOME | DRG: 253 | End: 2023-07-08
Attending: SURGERY | Admitting: SURGERY
Payer: MEDICARE

## 2023-07-05 DIAGNOSIS — I70.239 ATHEROSCLEROSIS OF NATIVE ARTERY OF RIGHT LOWER EXTREMITY WITH ULCERATION (HCC): ICD-10-CM

## 2023-07-05 DIAGNOSIS — I70.235 ATHEROSCLEROSIS OF NATIVE ARTERY OF RIGHT LOWER EXTREMITY WITH ULCERATION OF OTHER PART OF FOOT (HCC): ICD-10-CM

## 2023-07-05 DIAGNOSIS — Z91.89 AT RISK FOR BLEEDING: ICD-10-CM

## 2023-07-05 DIAGNOSIS — Z01.818 PRE-OP TESTING: ICD-10-CM

## 2023-07-05 PROBLEM — I73.9 PAD (PERIPHERAL ARTERY DISEASE): Status: ACTIVE | Noted: 2023-07-05

## 2023-07-05 PROBLEM — I73.9 PAD (PERIPHERAL ARTERY DISEASE) (HCC): Status: ACTIVE | Noted: 2023-07-05

## 2023-07-05 PROBLEM — F17.200 NICOTINE DEPENDENCE: Status: ACTIVE | Noted: 2021-05-19

## 2023-07-05 LAB
BASE EXCESS BLD CALC-SCNC: 1 MMOL/L
BLOOD TYPE BARCODE: 5100
CA-I BLD-SCNC: 1.11 MMOL/L (ref 1.12–1.32)
CO2 BLD-SCNC: 28 MMOL/L (ref 22–32)
GLUCOSE BLD-MCNC: 78 MG/DL (ref 70–99)
HCO3 BLD-SCNC: 26.2 MEQ/L
HCT VFR BLD CALC: 30 %
ISTAT ACTIVATED CLOTTING TIME: 185 SECONDS (ref 74–137)
PCO2 BLD: 46.6 MMHG
PH BLD: 7.36 [PH]
PO2 BLD: 475 MMHG
POTASSIUM BLD-SCNC: 3.5 MMOL/L (ref 3.6–5.1)
SAO2 % BLD: 100 %
SODIUM BLD-SCNC: 140 MMOL/L (ref 136–145)
UNIT VOLUME: 350 ML

## 2023-07-05 PROCEDURE — B41FYZZ FLUOROSCOPY OF RIGHT LOWER EXTREMITY ARTERIES USING OTHER CONTRAST: ICD-10-PCS | Performed by: SURGERY

## 2023-07-05 PROCEDURE — 76942 ECHO GUIDE FOR BIOPSY: CPT | Performed by: ANESTHESIOLOGY

## 2023-07-05 PROCEDURE — 99223 1ST HOSP IP/OBS HIGH 75: CPT | Performed by: INTERNAL MEDICINE

## 2023-07-05 PROCEDURE — 041K09L BYPASS RIGHT FEMORAL ARTERY TO POPLITEAL ARTERY WITH AUTOLOGOUS VENOUS TISSUE, OPEN APPROACH: ICD-10-PCS | Performed by: SURGERY

## 2023-07-05 PROCEDURE — 047H34Z DILATION OF RIGHT EXTERNAL ILIAC ARTERY WITH DRUG-ELUTING INTRALUMINAL DEVICE, PERCUTANEOUS APPROACH: ICD-10-PCS | Performed by: SURGERY

## 2023-07-05 DEVICE — STENT SERB65-09-60-80 PROTEGE GPS V06
Type: IMPLANTABLE DEVICE | Status: FUNCTIONAL
Brand: PROTÉGÉ™ GPS™

## 2023-07-05 RX ORDER — DEXAMETHASONE SODIUM PHOSPHATE 4 MG/ML
VIAL (ML) INJECTION AS NEEDED
Status: DISCONTINUED | OUTPATIENT
Start: 2023-07-05 | End: 2023-07-05 | Stop reason: SURG

## 2023-07-05 RX ORDER — NALOXONE HYDROCHLORIDE 0.4 MG/ML
80 INJECTION, SOLUTION INTRAMUSCULAR; INTRAVENOUS; SUBCUTANEOUS AS NEEDED
Status: ACTIVE | OUTPATIENT
Start: 2023-07-05 | End: 2023-07-06

## 2023-07-05 RX ORDER — FAMOTIDINE 20 MG/1
20 TABLET, FILM COATED ORAL 2 TIMES DAILY
Status: DISCONTINUED | OUTPATIENT
Start: 2023-07-05 | End: 2023-07-08

## 2023-07-05 RX ORDER — PHENYLEPHRINE HCL IN 0.9% NACL 50MG/250ML
PLASTIC BAG, INJECTION (ML) INTRAVENOUS CONTINUOUS
Status: DISCONTINUED | OUTPATIENT
Start: 2023-07-05 | End: 2023-07-08

## 2023-07-05 RX ORDER — LIDOCAINE HYDROCHLORIDE 10 MG/ML
INJECTION, SOLUTION EPIDURAL; INFILTRATION; INTRACAUDAL; PERINEURAL AS NEEDED
Status: DISCONTINUED | OUTPATIENT
Start: 2023-07-05 | End: 2023-07-05 | Stop reason: SURG

## 2023-07-05 RX ORDER — NITROGLYCERIN 20 MG/100ML
INJECTION INTRAVENOUS CONTINUOUS
Status: DISCONTINUED | OUTPATIENT
Start: 2023-07-05 | End: 2023-07-05

## 2023-07-05 RX ORDER — DIPHENHYDRAMINE HYDROCHLORIDE 50 MG/ML
12.5 INJECTION INTRAMUSCULAR; INTRAVENOUS AS NEEDED
Status: ACTIVE | OUTPATIENT
Start: 2023-07-05 | End: 2023-07-06

## 2023-07-05 RX ORDER — PHENYLEPHRINE HCL 10 MG/ML
VIAL (ML) INJECTION AS NEEDED
Status: DISCONTINUED | OUTPATIENT
Start: 2023-07-05 | End: 2023-07-05 | Stop reason: SURG

## 2023-07-05 RX ORDER — HEPARIN SODIUM 5000 [USP'U]/ML
INJECTION, SOLUTION INTRAVENOUS; SUBCUTANEOUS AS NEEDED
Status: DISCONTINUED | OUTPATIENT
Start: 2023-07-05 | End: 2023-07-05 | Stop reason: HOSPADM

## 2023-07-05 RX ORDER — NITROGLYCERIN 20 MG/100ML
INJECTION INTRAVENOUS CONTINUOUS PRN
Status: DISCONTINUED | OUTPATIENT
Start: 2023-07-05 | End: 2023-07-05 | Stop reason: SURG

## 2023-07-05 RX ORDER — ONDANSETRON 2 MG/ML
INJECTION INTRAMUSCULAR; INTRAVENOUS AS NEEDED
Status: DISCONTINUED | OUTPATIENT
Start: 2023-07-05 | End: 2023-07-05 | Stop reason: SURG

## 2023-07-05 RX ORDER — GLYCOPYRROLATE 0.2 MG/ML
INJECTION, SOLUTION INTRAMUSCULAR; INTRAVENOUS AS NEEDED
Status: DISCONTINUED | OUTPATIENT
Start: 2023-07-05 | End: 2023-07-05 | Stop reason: SURG

## 2023-07-05 RX ORDER — HEPARIN SODIUM 1000 [USP'U]/ML
INJECTION, SOLUTION INTRAVENOUS; SUBCUTANEOUS AS NEEDED
Status: DISCONTINUED | OUTPATIENT
Start: 2023-07-05 | End: 2023-07-05 | Stop reason: SURG

## 2023-07-05 RX ORDER — ONDANSETRON 2 MG/ML
4 INJECTION INTRAMUSCULAR; INTRAVENOUS ONCE AS NEEDED
Status: ACTIVE | OUTPATIENT
Start: 2023-07-05 | End: 2023-07-06

## 2023-07-05 RX ORDER — ASPIRIN 81 MG/1
81 TABLET ORAL DAILY
Status: DISCONTINUED | OUTPATIENT
Start: 2023-07-05 | End: 2023-07-05

## 2023-07-05 RX ORDER — DEXTROSE, SODIUM CHLORIDE, SODIUM LACTATE, POTASSIUM CHLORIDE, AND CALCIUM CHLORIDE 5; .6; .31; .03; .02 G/100ML; G/100ML; G/100ML; G/100ML; G/100ML
INJECTION, SOLUTION INTRAVENOUS CONTINUOUS
Status: DISCONTINUED | OUTPATIENT
Start: 2023-07-05 | End: 2023-07-08

## 2023-07-05 RX ORDER — IODIXANOL 320 MG/ML
100 INJECTION, SOLUTION INTRAVASCULAR
Status: COMPLETED | OUTPATIENT
Start: 2023-07-05 | End: 2023-07-05

## 2023-07-05 RX ORDER — HYDROCODONE BITARTRATE AND ACETAMINOPHEN 10; 325 MG/1; MG/1
1 TABLET ORAL EVERY 4 HOURS PRN
Status: DISCONTINUED | OUTPATIENT
Start: 2023-07-05 | End: 2023-07-08

## 2023-07-05 RX ORDER — ROCURONIUM BROMIDE 10 MG/ML
INJECTION, SOLUTION INTRAVENOUS AS NEEDED
Status: DISCONTINUED | OUTPATIENT
Start: 2023-07-05 | End: 2023-07-05 | Stop reason: SURG

## 2023-07-05 RX ORDER — SODIUM CHLORIDE 9 MG/ML
INJECTION, SOLUTION INTRAVENOUS CONTINUOUS PRN
Status: DISCONTINUED | OUTPATIENT
Start: 2023-07-05 | End: 2023-07-05 | Stop reason: SURG

## 2023-07-05 RX ORDER — PROTAMINE SULFATE 10 MG/ML
INJECTION, SOLUTION INTRAVENOUS AS NEEDED
Status: DISCONTINUED | OUTPATIENT
Start: 2023-07-05 | End: 2023-07-05 | Stop reason: SURG

## 2023-07-05 RX ORDER — HYDROMORPHONE HYDROCHLORIDE 1 MG/ML
0.4 INJECTION, SOLUTION INTRAMUSCULAR; INTRAVENOUS; SUBCUTANEOUS EVERY 5 MIN PRN
Status: ACTIVE | OUTPATIENT
Start: 2023-07-05 | End: 2023-07-05

## 2023-07-05 RX ORDER — METOCLOPRAMIDE HYDROCHLORIDE 5 MG/ML
10 INJECTION INTRAMUSCULAR; INTRAVENOUS ONCE AS NEEDED
Status: ACTIVE | OUTPATIENT
Start: 2023-07-05 | End: 2023-07-06

## 2023-07-05 RX ORDER — ONDANSETRON 2 MG/ML
4 INJECTION INTRAMUSCULAR; INTRAVENOUS EVERY 6 HOURS PRN
Status: DISCONTINUED | OUTPATIENT
Start: 2023-07-05 | End: 2023-07-08

## 2023-07-05 RX ORDER — CLOPIDOGREL BISULFATE 75 MG/1
75 TABLET ORAL DAILY
Status: DISCONTINUED | OUTPATIENT
Start: 2023-07-05 | End: 2023-07-08

## 2023-07-05 RX ORDER — ENOXAPARIN SODIUM 100 MG/ML
40 INJECTION SUBCUTANEOUS DAILY
Status: DISCONTINUED | OUTPATIENT
Start: 2023-07-06 | End: 2023-07-08

## 2023-07-05 RX ORDER — FAMOTIDINE 10 MG/ML
20 INJECTION, SOLUTION INTRAVENOUS 2 TIMES DAILY
Status: DISCONTINUED | OUTPATIENT
Start: 2023-07-05 | End: 2023-07-08

## 2023-07-05 RX ORDER — MORPHINE SULFATE 2 MG/ML
1 INJECTION, SOLUTION INTRAMUSCULAR; INTRAVENOUS EVERY 2 HOUR PRN
Status: DISCONTINUED | OUTPATIENT
Start: 2023-07-05 | End: 2023-07-08

## 2023-07-05 RX ORDER — SODIUM CHLORIDE 9 MG/ML
INJECTION, SOLUTION INTRAVENOUS CONTINUOUS
Status: DISCONTINUED | OUTPATIENT
Start: 2023-07-05 | End: 2023-07-06

## 2023-07-05 RX ORDER — CEFAZOLIN SODIUM/WATER 2 G/20 ML
2 SYRINGE (ML) INTRAVENOUS EVERY 8 HOURS
Status: COMPLETED | OUTPATIENT
Start: 2023-07-05 | End: 2023-07-06

## 2023-07-05 RX ORDER — TEMAZEPAM 7.5 MG/1
15 CAPSULE ORAL NIGHTLY PRN
Status: DISCONTINUED | OUTPATIENT
Start: 2023-07-05 | End: 2023-07-08

## 2023-07-05 RX ORDER — BUPIVACAINE HYDROCHLORIDE 5 MG/ML
INJECTION, SOLUTION EPIDURAL; INTRACAUDAL AS NEEDED
Status: DISCONTINUED | OUTPATIENT
Start: 2023-07-05 | End: 2023-07-05 | Stop reason: HOSPADM

## 2023-07-05 RX ORDER — HYDRALAZINE HYDROCHLORIDE 20 MG/ML
10 INJECTION INTRAMUSCULAR; INTRAVENOUS EVERY 6 HOURS PRN
Status: DISCONTINUED | OUTPATIENT
Start: 2023-07-05 | End: 2023-07-08

## 2023-07-05 RX ORDER — MIDAZOLAM HYDROCHLORIDE 1 MG/ML
1 INJECTION INTRAMUSCULAR; INTRAVENOUS EVERY 5 MIN PRN
Status: ACTIVE | OUTPATIENT
Start: 2023-07-05 | End: 2023-07-06

## 2023-07-05 RX ORDER — HYDROCODONE BITARTRATE AND ACETAMINOPHEN 10; 325 MG/1; MG/1
2 TABLET ORAL EVERY 4 HOURS PRN
Status: DISCONTINUED | OUTPATIENT
Start: 2023-07-05 | End: 2023-07-08

## 2023-07-05 RX ORDER — ACETAMINOPHEN 325 MG/1
650 TABLET ORAL EVERY 4 HOURS PRN
Status: DISCONTINUED | OUTPATIENT
Start: 2023-07-05 | End: 2023-07-08

## 2023-07-05 RX ORDER — SODIUM CHLORIDE, SODIUM LACTATE, POTASSIUM CHLORIDE, CALCIUM CHLORIDE 600; 310; 30; 20 MG/100ML; MG/100ML; MG/100ML; MG/100ML
INJECTION, SOLUTION INTRAVENOUS CONTINUOUS
Status: DISCONTINUED | OUTPATIENT
Start: 2023-07-05 | End: 2023-07-06

## 2023-07-05 RX ORDER — MORPHINE SULFATE 2 MG/ML
2 INJECTION, SOLUTION INTRAMUSCULAR; INTRAVENOUS EVERY 2 HOUR PRN
Status: DISCONTINUED | OUTPATIENT
Start: 2023-07-05 | End: 2023-07-08

## 2023-07-05 RX ORDER — MIDAZOLAM HYDROCHLORIDE 1 MG/ML
INJECTION INTRAMUSCULAR; INTRAVENOUS AS NEEDED
Status: DISCONTINUED | OUTPATIENT
Start: 2023-07-05 | End: 2023-07-05 | Stop reason: SURG

## 2023-07-05 RX ORDER — CEFAZOLIN SODIUM/WATER 2 G/20 ML
SYRINGE (ML) INTRAVENOUS AS NEEDED
Status: DISCONTINUED | OUTPATIENT
Start: 2023-07-05 | End: 2023-07-05 | Stop reason: SURG

## 2023-07-05 RX ORDER — ASPIRIN 81 MG/1
81 TABLET ORAL DAILY
Status: DISCONTINUED | OUTPATIENT
Start: 2023-07-05 | End: 2023-07-08

## 2023-07-05 RX ADMIN — PROTAMINE SULFATE 50 MG: 10 INJECTION, SOLUTION INTRAVENOUS at 14:48:00

## 2023-07-05 RX ADMIN — PHENYLEPHRINE HCL 100 MCG: 10 MG/ML VIAL (ML) INJECTION at 12:18:00

## 2023-07-05 RX ADMIN — SODIUM CHLORIDE: 9 INJECTION, SOLUTION INTRAVENOUS at 10:27:00

## 2023-07-05 RX ADMIN — PHENYLEPHRINE HCL 50 MCG: 10 MG/ML VIAL (ML) INJECTION at 13:27:00

## 2023-07-05 RX ADMIN — PHENYLEPHRINE HCL 100 MCG: 10 MG/ML VIAL (ML) INJECTION at 12:54:00

## 2023-07-05 RX ADMIN — CEFAZOLIN SODIUM/WATER 2 G: 2 G/20 ML SYRINGE (ML) INTRAVENOUS at 14:45:00

## 2023-07-05 RX ADMIN — PHENYLEPHRINE HCL 100 MCG: 10 MG/ML VIAL (ML) INJECTION at 12:15:00

## 2023-07-05 RX ADMIN — LIDOCAINE HYDROCHLORIDE 50 MG: 10 INJECTION, SOLUTION EPIDURAL; INFILTRATION; INTRACAUDAL; PERINEURAL at 10:32:00

## 2023-07-05 RX ADMIN — HEPARIN SODIUM 8000 UNITS: 1000 INJECTION, SOLUTION INTRAVENOUS; SUBCUTANEOUS at 12:58:00

## 2023-07-05 RX ADMIN — GLYCOPYRROLATE 0.3 MG: 0.2 INJECTION, SOLUTION INTRAMUSCULAR; INTRAVENOUS at 12:29:00

## 2023-07-05 RX ADMIN — ROCURONIUM BROMIDE 20 MG: 10 INJECTION, SOLUTION INTRAVENOUS at 12:50:00

## 2023-07-05 RX ADMIN — DEXAMETHASONE SODIUM PHOSPHATE 8 MG: 4 MG/ML VIAL (ML) INJECTION at 11:10:00

## 2023-07-05 RX ADMIN — SODIUM CHLORIDE: 9 INJECTION, SOLUTION INTRAVENOUS at 14:55:00

## 2023-07-05 RX ADMIN — PHENYLEPHRINE HCL 50 MCG: 10 MG/ML VIAL (ML) INJECTION at 13:06:00

## 2023-07-05 RX ADMIN — GLYCOPYRROLATE 0.3 MG: 0.2 INJECTION, SOLUTION INTRAMUSCULAR; INTRAVENOUS at 14:39:00

## 2023-07-05 RX ADMIN — ROCURONIUM BROMIDE 50 MG: 10 INJECTION, SOLUTION INTRAVENOUS at 10:34:00

## 2023-07-05 RX ADMIN — CEFAZOLIN SODIUM/WATER 2 G: 2 G/20 ML SYRINGE (ML) INTRAVENOUS at 10:45:00

## 2023-07-05 RX ADMIN — PHENYLEPHRINE HCL 100 MCG: 10 MG/ML VIAL (ML) INJECTION at 12:35:00

## 2023-07-05 RX ADMIN — MIDAZOLAM HYDROCHLORIDE 3 MG: 1 INJECTION INTRAMUSCULAR; INTRAVENOUS at 10:29:00

## 2023-07-05 RX ADMIN — SODIUM CHLORIDE: 9 INJECTION, SOLUTION INTRAVENOUS at 15:55:00

## 2023-07-05 RX ADMIN — PHENYLEPHRINE HCL 100 MCG: 10 MG/ML VIAL (ML) INJECTION at 14:41:00

## 2023-07-05 RX ADMIN — NITROGLYCERIN 30 MCG/MIN: 20 INJECTION INTRAVENOUS at 15:46:00

## 2023-07-05 RX ADMIN — ONDANSETRON 4 MG: 2 INJECTION INTRAMUSCULAR; INTRAVENOUS at 14:48:00

## 2023-07-05 NOTE — ANESTHESIA PROCEDURE NOTES
Peripheral IV  Date/Time: 7/5/2023 10:45 AM  Inserted by: Carina Alatorre MD    Placement  Needle size: 18 G  Laterality: right  Location: hand  Local anesthetic: none  Site prep: chlorhexidine and alcohol  Technique: anatomical landmarks  Attempts: 1

## 2023-07-05 NOTE — PLAN OF CARE
Received from [facility] at 1600 with nitroglycerin gtt @ 20. Titrated as need- shut off due to rapid hypotension. Blood pressure elevated quickly off nitroglycerin, MD Watts notified & cardene gtt started. RLE pulses palpable. Lety hugger on. IVF started. Rose intact- rose care done. Tolerating clears. Family at bedside. Problem: Patient/Family Goals  Goal: Patient/Family Long Term Goal  Description: Patient's Long Term Goal: to be home and walking    Interventions:  - podiatry follow up  - See additional Care Plan goals for specific interventions  Outcome: Progressing  Goal: Patient/Family Short Term Goal  Description: Patient's Short Term Goal: to be out of the hospital    Interventions:   - adhere to medications  - See additional Care Plan goals for specific interventions  Outcome: Progressing     Problem: CARDIOVASCULAR - ADULT  Goal: Maintains optimal cardiac output and hemodynamic stability  Description: INTERVENTIONS:  - Monitor vital signs, rhythm, and trends  - Monitor for bleeding, hypotension and signs of decreased cardiac output  - Evaluate effectiveness of vasoactive medications to optimize hemodynamic stability  - Monitor arterial and/or venous puncture sites for bleeding and/or hematoma  - Assess quality of pulses, skin color and temperature  - Assess for signs of decreased coronary artery perfusion - ex.  Angina  - Evaluate fluid balance, assess for edema, trend weights  Outcome: Progressing  Goal: Absence of cardiac arrhythmias or at baseline  Description: INTERVENTIONS:  - Continuous cardiac monitoring, monitor vital signs, obtain 12 lead EKG if indicated  - Evaluate effectiveness of antiarrhythmic and heart rate control medications as ordered  - Initiate emergency measures for life threatening arrhythmias  - Monitor electrolytes and administer replacement therapy as ordered  Outcome: Progressing     Problem: Peripheral vascular status not within defined limits  Goal: Pt will have peripheral vascular status adequate for disch  Outcome: Progressing

## 2023-07-05 NOTE — ANESTHESIA POSTPROCEDURE EVALUATION
[facility] Patient Status:  Inpatient   Age/Gender 71year old male MRN [id number]   [facility] 6NE-A Attending Eli Ogden MD   Hosp Day # 0 PCP Candace Mohan MD       Anesthesia Post-op Note    RIGHT EXTERNAL ILIAC ARTERY STENT AND FEMORAL POPLITEAL BYPASS  SEE CATH LAB REPORT    Procedure Summary       Date: 07/05/23 Room / Location: [address] [place] [address]    Anesthesia Start: 1027 Anesthesia Stop:     Procedures:       RIGHT EXTERNAL ILIAC ARTERY STENT AND FEMORAL POPLITEAL BYPASS SEE CATH LAB REPORT (Right)      ANGIOGRAM ADD-ON (Right) Diagnosis: (ATHEROSCLEROSIS OF NATIVE ARTERY OF RIGHT LOWER EXTREMITY WITH ULCERATION OF OTHER PART OF FOOT)    Surgeons: Eli Ogden MD Anesthesiologist: Farzana Powers MD    Anesthesia Type: Not recorded ASA Status: Not recorded            Anesthesia Type: No value filed. Vitals Value Taken Time   /87 07/05/23 1557   Temp 96.1 07/05/23 1557   Pulse 76 07/05/23 1557   Resp 16 07/05/23 1557   SpO2 93 07/05/23 1557       Patient Location: ICU    Anesthesia Type: general    Airway Patency: patent and extubated    Postop Pain Control: adequate    Mental Status: mildly sedated but able to meaningfully participate in the post-anesthesia evaluation    Nausea/Vomiting: none    Cardiopulmonary/Hydration status: stable euvolemic    Complications: no apparent anesthesia related complications    Postop vital signs: stable    Dental Exam: Unchanged from Preop    Patient to be transferred to ICU.

## 2023-07-05 NOTE — ANESTHESIA PROCEDURE NOTES
Airway  Date/Time: 7/5/2023 10:35 AM  Urgency: elective      General Information and Staff    Patient location during procedure: OR  Anesthesiologist: Crow Londono MD  Performed: anesthesiologist   Performed by: Crow Londono MD  Authorized by: Crow Londono MD      Indications and Patient Condition  Indications for airway management: anesthesia  Spontaneous ventilation: present  Sedation level: moderate (conscious sedation)  Preoxygenated: yes  Patient position: sniffing  Mask difficulty assessment: 1 - vent by mask    Final Airway Details  Final airway type: endotracheal airway      Successful airway: ETT  Cuffed: yes   Successful intubation technique: Video laryngoscopy  Facilitating devices/methods: intubating stylet  Endotracheal tube insertion site: oral  Blade: GlideScope  Blade size: #3  ETT size (mm): 7.5    Cormack-Lehane Classification: grade IIA - partial view of glottis  Placement verified by: capnometry   Measured from: lips  ETT to lips (cm): 23  Number of attempts at approach: 1  Number of other approaches attempted: 1    Other Attempts  Unsuccessful attempted endotracheal techniques: direct laryngoscopy    Additional Comments  MAC 3 x 1 w/o success - epiglottis seen but not vocal cords, concern to protect  loose teeth and so switched to glide scope #3 - distant glottic opening but half of glottis seen, easy mask airway w/o OA

## 2023-07-05 NOTE — BRIEF OP NOTE
Pre-Operative Diagnosis: ATHEROSCLEROSIS OF NATIVE ARTERY OF RIGHT LOWER EXTREMITY WITH ULCERATION OF OTHER PART OF FOOT     Post-Operative Diagnosis: ATHEROSCLEROSIS OF NATIVE ARTERY OF RIGHT LOWER EXTREMITY WITH ULCERATION OF OTHER PART OF FOOT      Procedure Performed:    Angiogram right iliac  Balloon angioplasty and stent of right external iliac artery with 9x60 protege  Right common femoral artery to popliteal artery bypass with non reversed in situ saphenous vein    Surgeon(s) and Role:     Jean Steward MD - Primary    Assistant(s):        Surgical Findings:   Palpable pulse in bypass graft and doppler dorsalis pedis signal      Specimen: none     Estimated Blood Loss: 300 mL     Sage Frazier MD  7/5/2023  3:36 PM

## 2023-07-05 NOTE — ANESTHESIA PROCEDURE NOTES
Arterial Line    Date/Time: 7/5/2023 10:38 AM    Performed by: Beto Hines MD  Authorized by: Beto Hines MD    General Information and Staff    Procedure Start:  7/5/2023 10:38 AM  Procedure End:  7/5/2023 10:42 AM  Anesthesiologist:  Beto Hines MD  Performed By:  Anesthesiologist  Patient Location:  OR  Indication: continuous blood pressure monitoring and blood sampling needed    Site Identification: real time ultrasound guided and image stored and retrievable    Preanesthetic Checklist: 2 patient identifiers, IV checked, risks and benefits discussed, monitors and equipment checked, pre-op evaluation, timeout performed, anesthesia consent and sterile technique used    Procedure Details    Catheter Size:  20 G  Catheter Length:  1 and 3/4 inch  Catheter Type:  Arrow  Seldinger Technique?: Yes    Laterality:  Left  Site:  Radial artery  Site Prep: chlorhexidine    Line Secured:  Wrist Brace, tape and Tegaderm    Assessment    Events: patient tolerated procedure well with no complications      Medications  7/5/2023 10:38 AM      Additional Comments

## 2023-07-06 ENCOUNTER — APPOINTMENT (OUTPATIENT)
Dept: GENERAL RADIOLOGY | Facility: HOSPITAL | Age: 73
DRG: 253 | End: 2023-07-06
Attending: STUDENT IN AN ORGANIZED HEALTH CARE EDUCATION/TRAINING PROGRAM
Payer: MEDICARE

## 2023-07-06 ENCOUNTER — APPOINTMENT (OUTPATIENT)
Dept: GENERAL RADIOLOGY | Facility: HOSPITAL | Age: 73
End: 2023-07-06
Attending: STUDENT IN AN ORGANIZED HEALTH CARE EDUCATION/TRAINING PROGRAM
Payer: MEDICARE

## 2023-07-06 PROBLEM — I96 GANGRENE OF RIGHT FOOT (HCC): Status: ACTIVE | Noted: 2023-07-06

## 2023-07-06 LAB
ANION GAP SERPL CALC-SCNC: 5 MMOL/L (ref 0–18)
BUN BLD-MCNC: 13 MG/DL (ref 7–18)
CALCIUM BLD-MCNC: 8.4 MG/DL (ref 8.5–10.1)
CHLORIDE SERPL-SCNC: 113 MMOL/L (ref 98–112)
CHOLEST SERPL-MCNC: 178 MG/DL (ref ?–200)
CO2 SERPL-SCNC: 22 MMOL/L (ref 21–32)
CREAT BLD-MCNC: 0.84 MG/DL
ERYTHROCYTE [DISTWIDTH] IN BLOOD BY AUTOMATED COUNT: 14.5 %
GFR SERPLBLD BASED ON 1.73 SQ M-ARVRAT: 93 ML/MIN/1.73M2 (ref 60–?)
GLUCOSE BLD-MCNC: 127 MG/DL (ref 70–99)
HCT VFR BLD AUTO: 31.7 %
HDLC SERPL-MCNC: 37 MG/DL (ref 40–59)
HGB BLD-MCNC: 10.5 G/DL
ISTAT ACTIVATED CLOTTING TIME: 281 SECONDS (ref 74–137)
ISTAT ACTIVATED CLOTTING TIME: 281 SECONDS (ref 74–137)
ISTAT ACTIVATED CLOTTING TIME: 305 SECONDS (ref 74–137)
LDLC SERPL CALC-MCNC: 128 MG/DL (ref ?–100)
MAGNESIUM SERPL-MCNC: 2.2 MG/DL (ref 1.6–2.6)
MCH RBC QN AUTO: 28.6 PG (ref 26–34)
MCHC RBC AUTO-ENTMCNC: 33.1 G/DL (ref 31–37)
MCV RBC AUTO: 86.4 FL
NONHDLC SERPL-MCNC: 141 MG/DL (ref ?–130)
OSMOLALITY SERPL CALC.SUM OF ELEC: 292 MOSM/KG (ref 275–295)
PLATELET # BLD AUTO: 322 10(3)UL (ref 150–450)
POTASSIUM SERPL-SCNC: 4.2 MMOL/L (ref 3.5–5.1)
RBC # BLD AUTO: 3.67 X10(6)UL
SODIUM SERPL-SCNC: 140 MMOL/L (ref 136–145)
TRIGL SERPL-MCNC: 70 MG/DL (ref 30–149)
VLDLC SERPL CALC-MCNC: 12 MG/DL (ref 0–30)
WBC # BLD AUTO: 10.6 X10(3) UL (ref 4–11)

## 2023-07-06 PROCEDURE — 99222 1ST HOSP IP/OBS MODERATE 55: CPT | Performed by: STUDENT IN AN ORGANIZED HEALTH CARE EDUCATION/TRAINING PROGRAM

## 2023-07-06 PROCEDURE — 73630 X-RAY EXAM OF FOOT: CPT | Performed by: STUDENT IN AN ORGANIZED HEALTH CARE EDUCATION/TRAINING PROGRAM

## 2023-07-06 RX ORDER — DOCUSATE SODIUM 100 MG/1
100 CAPSULE, LIQUID FILLED ORAL 2 TIMES DAILY
Status: DISCONTINUED | OUTPATIENT
Start: 2023-07-06 | End: 2023-07-08

## 2023-07-06 RX ORDER — POLYETHYLENE GLYCOL 3350 17 G/17G
17 POWDER, FOR SOLUTION ORAL DAILY
Status: DISCONTINUED | OUTPATIENT
Start: 2023-07-06 | End: 2023-07-08

## 2023-07-06 RX ORDER — LOSARTAN POTASSIUM 25 MG/1
25 TABLET ORAL DAILY
Status: DISCONTINUED | OUTPATIENT
Start: 2023-07-06 | End: 2023-07-08

## 2023-07-06 NOTE — PHYSICAL THERAPY NOTE
Physical therapy consult requested. 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 Post-op shoe ordered, awaiting delivery. Will hold physical therapy evaluation until post-op shoe at bedside.

## 2023-07-06 NOTE — PROGRESS NOTES
Looks good  Palpable pulse in foot abdominal bypass graft    Can transfer  Physical therapy evaluation

## 2023-07-06 NOTE — CONSULTS
[facility]    Report of Consultation    Cecelia Caal Patient Status:  Inpatient    1950 MRN [id number]   [facility] 6NE-A Attending Roberto Carlos Irwin MD   [facility] Day # 1 PCP Mansoor Villaseñor MD     Date of Admission:  2023  Date of Consult:  2023    Reason for Consultation:  Right hallux gangrene    History of Present Illness:  Cecelia Caal is a a(n) 71year old male with past medical history of peripheral vascular disease and tobacco use who was seen at bedside with son this morning for evaluation of right foot wound. Patient has had gangrenous changes to right hallux for several months. He was evaluated by Dr. Fabrice Mendes and underwent right lower extremity bypass on 2023. He does have some pain in the foot. Relates that pain is improved from how it was in the past.  No other complaints are mentioned. Past medical history, medications, allergies reviewed. History:  Past Medical History:   Diagnosis Date    IBS (irritable bowel syndrome)     Peripheral vascular disease ([address]      Past Surgical History:   Procedure Laterality Date    COLONOSCOPY N/A 2021    Procedure: COLONOSCOPY with biopsies;  Surgeon: Beth Burk DO;  Location: [facility] ENDOSCOPY    OTHER       right knee scope     Family History   Problem Relation Age of Onset    Heart Disease Mother     Heart Disease Father     Prostate Cancer Father       reports that he has been smoking cigarettes. He has a 6.50 pack-year smoking history. He has never used smokeless tobacco. He reports current alcohol use of about 2.0 standard drinks of alcohol per week. He reports current drug use. Drug: Cannabis.     Allergies:  No Known Allergies    Medications:    Current Facility-Administered Medications:     docusate sodium (Colace) cap 100 mg, 100 mg, Oral, BID    polyethylene glycol (PEG 3350) (Miralax) 17 g oral packet 17 g, 17 g, Oral, Daily    losartan (Cozaar) tab 25 mg, 25 mg, Oral, Daily    dextrose in lactated ringers 5% infusion, , Intravenous, Continuous    ondansetron (Zofran) 4 MG/2ML injection 4 mg, 4 mg, Intravenous, Q6H PRN    enoxaparin (Lovenox) 40 MG/0.4ML SUBQ injection 40 mg, 40 mg, Subcutaneous, Daily    acetaminophen (Tylenol) tab 650 mg, 650 mg, Oral, Q4H PRN **OR** HYDROcodone-acetaminophen (Norco)  MG per tab 1 tablet, 1 tablet, Oral, Q4H PRN **OR** HYDROcodone-acetaminophen (Norco)  MG per tab 2 tablet, 2 tablet, Oral, Q4H PRN    morphINE PF 2 MG/ML injection 1 mg, 1 mg, Intravenous, Q2H PRN **OR** morphINE PF 2 MG/ML injection 2 mg, 2 mg, Intravenous, Q2H PRN    temazepam (Restoril) cap 15 mg, 15 mg, Oral, Nightly PRN    aspirin DR tab 81 mg, 81 mg, Oral, Daily    clopidogrel (Plavix) tab 75 mg, 75 mg, Oral, Daily    famotidine (Pepcid) tab 20 mg, 20 mg, Oral, BID **OR** famotidine (Pepcid) 20 mg/2mL injection 20 mg, 20 mg, Intravenous, BID    phenylephrine in sodium chloride 0.9% (Jose-Synephrine) 50 mg/250mL infusion premix,  mcg/min, Intravenous, Continuous    niCARdipine in sodium chloride 0.86% (carDENE) 20 mg/200mL infusion premix, 5-15 mg/hr, Intravenous, Continuous    norepinephrine (Levophed) 4 mg/250mL infusion premix, 0.5-30 mcg/min, Intravenous, Continuous    hydrALAzine (Apresoline) 20 mg/mL injection 10 mg, 10 mg, Intravenous, Q6H PRN    Review of Systems: Denies nausea, vomiting, fever, chills. Physical Exam:      Derm: Dry gangrenous changes noted to medial aspect of right hallux. No erythema, purulence, fluctuance, or other concerns are noted. Some superficial ischemic changes noted to right lateral foot. Vascular: Unable to palpate pedal pulses. CFT delayed to digits. Neuro: Pain sensation intact to digits. MSK: Strength testing deferred. Compartments are soft and compressible. Imaging:  No results found.      Laboratory Data:  Recent Labs   Lab 07/03/23  1015 07/06/23  0324   RBC 4.28 3.67*   HGB 12.3* 10.5*   HCT 37.2* 31.7*   MCV 86.9 86.4   MCH 28.7 28.6   [facility] 33.1 33.1   RDW 14.5 14.5   NEPRELIM 2.65  --    WBC 7.3 10.6   .0 322.0       Impression and Plan:  Patient Active Problem List:     Nicotine dependence     Diarrhea     Chronic pain in right foot     PAD (peripheral artery disease) ([facility]      -Patient examined, chart history reviewed. -Patient is afebrile without leukocytosis. -X-rays ordered to evaluate for bony erosion/osteomyelitis.  -Clinically, gangrene is dry and stable and fairly localized/small. Hopefully now that patient is undergone bypass, can heal with local wound care. If x-rays look okay, likely plan for local wound care and close clinical follow-up. Discussed with patient that if there are any concerns and x-rays will likely recommend partial hallux amputation.  -Today site was dressed with Betadine. Recommend similar dressing changes daily. Patient should not get site wet in shower to keep site dry at all times.  -Postop shoe ordered for right foot. -We will plan to continue to follow and have further recommendations pending x-ray results.  -All questions were answered to satisfaction. Appreciate the opportunity participate in patient's care.     Julita Marquez DPM   7/6/2023  10:31 AM

## 2023-07-06 NOTE — PLAN OF CARE
Assumed care at [facility]; s/p R ext iliac stent, fem pop bypass. Initially Cardene drip infusing to keep SBP <160; weaned off at 2100. He is alert/oriented x 4. Following commands, siegel x 4. SB-SR on tele. RLE pulses palpable. R groin, leg surgical dressings intact. Tolerated regular dinner tray. Overnight, Hydralazine/IV given x 1 to keep BP goal.  0630- BP remain at goal. rose Patel cath removed. Up in chair this morning.

## 2023-07-06 NOTE — PLAN OF CARE
Assumed care this am approx 0730. NSR on tele, room air, afebrile. Palpable pulses. R foot warm to touch w/ good sensation. Denies pain. SBP>160, PRN hydralazine used w/ good results, Cozaar added see MAR. Pt updated with plan, will continue.

## 2023-07-06 NOTE — PHYSICAL THERAPY NOTE
PHYSICAL THERAPY EVALUATION - INPATIENT     Room Number: 1507/5846-Z  Evaluation Date: 7/6/2023  Type of Evaluation: Initial  Physician Order: PT Eval and Treat    Presenting Problem: Atherosclerosis of native artery of RLE s/p balloon angioplasty and stent of R external iliac artery and R common femoral artery to popliteal artery bypass  Co-Morbidities : Gangrene R great toe, PAD, nicotine dependence  Reason for Therapy: Mobility Dysfunction and Discharge Planning      ASSESSMENT   Pt is a 71year old male admitted on 7/5/2023 for planned vascular surgery. Upon evaluation the pt presents with impaired integumentary integrity, gangrene of R great toe. Despite this impairment the pt exhibits adequate strength, balance, and endurance in order to complete bed mobility, transfers, ambulation and stairs with modified to complete independence. The -PAC '6-Clicks' Inpatient Basic Mobility Short Form was completed and this patient is demonstrating a Approx Degree of Impairment: 0%  degree of impairment in mobility. Research supports that patients with this level of impairment may be safe for discharge home without therapy services. PT Discharge Recommendations: Home      PLAN  Patient has been evaluated and presents with no inpatient physical therapy needs. Patient discharged from inpatient physical therapy services at this time. Please reconsult if there is a change in status that would necessitate PT services. GOALS  Patient was able to achieve the following goals . .. Patient was able to transfer At previous, functional level  Safely and independently   Patient able to ambulate on level surfaces At previous, functional level  Safely and independently         HOME SITUATION  Type of Home: [address] [place] [address] Street: One level  Stairs to Enter : 1             Lives With: Spouse (dog, Angel)  Drives: Yes  Patient Owned Equipment: None  Patient Regularly Uses: Glasses    Prior Level of [place]:  The pt reports he is typically independent with ambulation and I/ADL's. Pt is retired and enjoys watching [employer] racing. SUBJECTIVE  Pt reports his biggest complaint/symptoms was that his leg would hurt with even short distance ambulation. \"I first noticed it when I couldn't walk my dog even a half a block. \"     Pt reports improvement with ambulation tolerance, \"My leg would have been hurting by now. I wouldn't been able to walk this far. \"      OBJECTIVE  Precautions:  (Right post-op shoe)  Fall Risk: Standard fall risk    WEIGHT BEARING RESTRICTION  Weight Bearing Restriction: None                PAIN ASSESSMENT  Ratin          COGNITION  Overall Cognitive Status:  WFL - within functional limits    RANGE OF MOTION AND STRENGTH ASSESSMENT  Upper extremity ROM and strength are within functional limits     Lower extremity ROM is within functional limits     Lower extremity strength is within functional limits, grossly 5/5  -R dorsiflexion/plantarflexion not formally tested due to gangrene      BALANCE  Static Sitting: Good  Dynamic Sitting: Good  Static Standing: Good  Dynamic Standing: Fair +        ACTIVITY TOLERANCE  Pulse: 75  Heart Rate Source: Monitor  Resp: 16                O2 WALK  Oxygen Therapy  SPO2% on Room Air at Rest: 100      AM-PAC '6-Clicks' INPATIENT SHORT FORM - BASIC MOBILITY  How much difficulty does the patient currently have. .. Patient Difficulty: Turning over in bed (including adjusting bedclothes, sheets and blankets)?: None   Patient Difficulty: Sitting down on and standing up from a chair with arms (e.g., wheelchair, bedside commode, etc.): None   Patient Difficulty: Moving from lying on back to sitting on the side of the bed?: None   How much help from another person does the patient currently need. ..    Help from Another: Moving to and from a bed to a chair (including a wheelchair)?: None   Help from Another: Need to walk in hospital room?: None   Help from Another: Climbing 3-5 steps with a railing?: None       AM-PAC Score:  Raw Score: 24   Approx Degree of Impairment: 0%   Standardized Score (AM-PAC Scale): 61.14   CMS Modifier (G-Code): CH    FUNCTIONAL ABILITY STATUS  Gait Assessment   Functional Mobility/Gait Assessment  Gait Assistance: Modified independent  Distance (ft): 150  Assistive Device: None  Pattern:  (step-through gait pattern, increased trunk flexion)  Stairs: Stairs  How Many Stairs: 4  Device: 1 Rail  Assist: Modified independent  Pattern: Ascend and Descend  Ascend and Descend : Reciprocal    Skilled Therapy Provided     Bed Mobility:  Rolling: Mod I  Supine to sit: Mod I   Sit to supine: Mod I     Transfer Mobility:  Sit to stand: Independent   Stand to sit: Independent  Gait: Mod I  Stairs: Mod I    Therapist's comments: Pt doffed socks and donned surgical shoe and left sandal at start of session. Pt doffed surgical shoes and sandal at end of session. Patient End of Session: Needs met;Call light within reach; In bed;RN aware of session/findings; All patient questions and concerns addressed; Family present    Patient Evaluation Complexity Level:  History High - 3 or more personal factors and/or co-morbidities   Examination of body systems Low - addressing 1-2 elements   Clinical Presentation Low - Stable   Clinical Decision Making Low Complexity       PT Session Time: 25 minutes

## 2023-07-07 ENCOUNTER — TELEPHONE (OUTPATIENT)
Dept: PODIATRY CLINIC | Facility: CLINIC | Age: 73
End: 2023-07-07

## 2023-07-07 PROBLEM — I96 GANGRENE (HCC): Status: ACTIVE | Noted: 2023-07-06

## 2023-07-07 PROCEDURE — 99231 SBSQ HOSP IP/OBS SF/LOW 25: CPT | Performed by: STUDENT IN AN ORGANIZED HEALTH CARE EDUCATION/TRAINING PROGRAM

## 2023-07-07 RX ORDER — ATORVASTATIN CALCIUM 40 MG/1
40 TABLET, FILM COATED ORAL NIGHTLY
Qty: 30 TABLET | Refills: 0 | Status: SHIPPED | OUTPATIENT
Start: 2023-07-07

## 2023-07-07 RX ORDER — CLOPIDOGREL BISULFATE 75 MG/1
75 TABLET ORAL DAILY
Qty: 30 TABLET | Refills: 2 | Status: SHIPPED | OUTPATIENT
Start: 2023-07-08 | End: 2023-07-08

## 2023-07-07 RX ORDER — LOSARTAN POTASSIUM 25 MG/1
25 TABLET ORAL DAILY
Qty: 30 TABLET | Refills: 0 | Status: SHIPPED | OUTPATIENT
Start: 2023-07-08

## 2023-07-07 NOTE — PLAN OF CARE
Pt. Up to restroom with assist. SR on monitor, had 10 beat VT at 2130 while pt. Was asleep. Hospitalist notified, EKG done and magnesium level checked. Right leg surgical dressing in place with old small amount of old drainage. Pulses to feet weakly palpable, verified with doppler. Denies pain. Waiting on tele bed to become available to transfer. Report called to CTU 7 RN.

## 2023-07-07 NOTE — PROGRESS NOTES
[facility]    Progress Note    Neil Arevalo Patient Status:  Inpatient    1950 MRN [id number]   [facility] 6NE-A Attending Oumar Stewart MD   [facility] Day # 2 PCP Krishna Rodriguez MD     Date of Admission:  2023    History of Present Illness:  Neil Arevalo is a a(n) 71year old male with past medical history of peripheral vascular disease and tobacco use who was seen at bedside with son this morning for evaluation of right foot wound. Patient has had gangrenous changes to right hallux for several months. He was evaluated by Dr. Amy Mcclain and underwent right lower extremity bypass on 2023. He did complete x-rays yesterday which showed some changes from gout but no concerns in region of wound. He is hoping to go home today. No other complaints are mentioned. History:  Past Medical History:   Diagnosis Date    IBS (irritable bowel syndrome)     Peripheral vascular disease ([address]      Past Surgical History:   Procedure Laterality Date    COLONOSCOPY N/A 2021    Procedure: COLONOSCOPY with biopsies;  Surgeon: Gil High DO;  Location: [address] [place] [address] ENDOSCOPY    OTHER       right knee scope     Family History   Problem Relation Age of Onset    Heart Disease Mother     Heart Disease Father     Prostate Cancer Father       reports that he has been smoking cigarettes. He has a 6.50 pack-year smoking history. He has never used smokeless tobacco. He reports current alcohol use of about 2.0 standard drinks of alcohol per week. He reports current drug use. Drug: Cannabis.     Allergies:  No Known Allergies    Medications:    Current Facility-Administered Medications:     docusate sodium (Colace) cap 100 mg, 100 mg, Oral, BID    polyethylene glycol (PEG 3350) (Miralax) 17 g oral packet 17 g, 17 g, Oral, Daily    losartan (Cozaar) tab 25 mg, 25 mg, Oral, Daily    dextrose in lactated ringers 5% infusion, , Intravenous, Continuous    ondansetron (Zofran) 4 MG/2ML injection 4 mg, 4 mg, Intravenous, Q6H PRN    enoxaparin (Lovenox) 40 MG/0.4ML SUBQ injection 40 mg, 40 mg, Subcutaneous, Daily    acetaminophen (Tylenol) tab 650 mg, 650 mg, Oral, Q4H PRN **OR** HYDROcodone-acetaminophen (Norco)  MG per tab 1 tablet, 1 tablet, Oral, Q4H PRN **OR** HYDROcodone-acetaminophen (Norco)  MG per tab 2 tablet, 2 tablet, Oral, Q4H PRN    morphINE PF 2 MG/ML injection 1 mg, 1 mg, Intravenous, Q2H PRN **OR** morphINE PF 2 MG/ML injection 2 mg, 2 mg, Intravenous, Q2H PRN    temazepam (Restoril) cap 15 mg, 15 mg, Oral, Nightly PRN    aspirin DR tab 81 mg, 81 mg, Oral, Daily    clopidogrel (Plavix) tab 75 mg, 75 mg, Oral, Daily    famotidine (Pepcid) tab 20 mg, 20 mg, Oral, BID **OR** famotidine (Pepcid) 20 mg/2mL injection 20 mg, 20 mg, Intravenous, BID    phenylephrine in sodium chloride 0.9% (Jose-Synephrine) 50 mg/250mL infusion premix,  mcg/min, Intravenous, Continuous    niCARdipine in sodium chloride 0.86% (carDENE) 20 mg/200mL infusion premix, 5-15 mg/hr, Intravenous, Continuous    norepinephrine (Levophed) 4 mg/250mL infusion premix, 0.5-30 mcg/min, Intravenous, Continuous    hydrALAzine (Apresoline) 20 mg/mL injection 10 mg, 10 mg, Intravenous, Q6H PRN    Review of Systems: Denies nausea, vomiting, fever, chills. Physical Exam:      Derm: Dry gangrenous changes noted to medial aspect of right hallux. No erythema, purulence, fluctuance, or other concerns are noted. Some superficial ischemic changes noted to right lateral foot. Vascular: Unable to palpate pedal pulses. CFT delayed to digits. Neuro: Pain sensation intact to digits. MSK: Strength testing deferred. Compartments are soft and compressible. Imaging:  XR FOOT, COMPLETE (MIN 3 VIEWS), RIGHT (CPT=73630)    Result Date: 7/6/2023  CONCLUSION:  Small erosions within the 1st metatarsal head could be related to both degenerative and inflammatory arthritis such as gout.    [facility]   Dictated by (CST): Cassidy Donald, Gilberto Soto MD on 7/06/2023 at 1:24 PM     Finalized by (CST): Odette Chong MD on 7/06/2023 at 1:26 PM         Laboratory Data:  Recent Labs   Lab 07/03/23  1015 07/06/23  0324   RBC 4.28 3.67*   HGB 12.3* 10.5*   HCT 37.2* 31.7*   MCV 86.9 86.4   MCH 28.7 28.6   MCHC 33.1 33.1   RDW 14.5 14.5   NEPRELIM 2.65  --    WBC 7.3 10.6   .0 322.0       Impression and Plan:  Patient Active Problem List:     Nicotine dependence     Diarrhea     Chronic pain in right foot     PAD (peripheral artery disease) ([facility])     Gangrene of right foot ([facility] [address]      -Patient examined, chart history reviewed. -Patient is afebrile without leukocytosis. -X-rays reviewed--some changes to 1st met head consistent with gout--no erosions to region of wound.  -Clinically, gangrene is dry and stable and fairly localized/small. Hopefully now that patient is undergone bypass, can heal with local wound care. -Today site was dressed with Betadine. Recommend similar dressing changes daily. Patient should not get site wet in shower to keep site dry at all times.  -Patient should use post op shoe to right foot. -OK for DC from podiatry standpoint when OK with other services. Will continue to follow on outpatient basis. Our office will call for appt. -All questions were answered to satisfaction. Appreciate the opportunity participate in patient's care.     Haseeb Diaz DPM   7/7/2023

## 2023-07-08 VITALS
TEMPERATURE: 98 F | HEART RATE: 65 BPM | HEIGHT: 69 IN | SYSTOLIC BLOOD PRESSURE: 169 MMHG | RESPIRATION RATE: 18 BRPM | OXYGEN SATURATION: 98 % | DIASTOLIC BLOOD PRESSURE: 49 MMHG | WEIGHT: 139.31 LBS | BODY MASS INDEX: 20.63 KG/M2

## 2023-07-08 PROBLEM — I10 ESSENTIAL HYPERTENSION: Status: ACTIVE | Noted: 2023-07-08

## 2023-07-08 LAB
ATRIAL RATE: 75 BPM
P AXIS: 70 DEGREES
P-R INTERVAL: 138 MS
Q-T INTERVAL: 376 MS
QRS DURATION: 94 MS
QTC CALCULATION (BEZET): 419 MS
R AXIS: 40 DEGREES
T AXIS: -33 DEGREES
VENTRICULAR RATE: 75 BPM

## 2023-07-08 PROCEDURE — 99231 SBSQ HOSP IP/OBS SF/LOW 25: CPT | Performed by: HOSPITALIST

## 2023-07-08 RX ORDER — CLOPIDOGREL BISULFATE 75 MG/1
75 TABLET ORAL DAILY
Qty: 90 TABLET | Refills: 0 | Status: SHIPPED | OUTPATIENT
Start: 2023-07-08 | End: 2023-10-06

## 2023-07-08 RX ORDER — HYDROCODONE BITARTRATE AND ACETAMINOPHEN 5; 325 MG/1; MG/1
1-2 TABLET ORAL EVERY 4 HOURS PRN
Qty: 60 TABLET | Refills: 0 | Status: SHIPPED | OUTPATIENT
Start: 2023-07-08

## 2023-07-08 NOTE — DISCHARGE INSTRUCTIONS
Can shower and get incisions wet  Cover with guaze as needed  Clean incisions with clean hands and baby wipes after urinating and having bowel movements, avoid getting stool and urine on incisions  Walk 20-30 minutes daily with surgical shoe  Elevate leg when in bed on 1-2 pillows and when sitting in chair  Take aspirin and clopidogrel daily -  all prescriptions today   Follow up with primary to review new medications   Drink water and eat vegetables daily to avoid constipation

## 2023-07-08 NOTE — PLAN OF CARE
Assumed care of pt 0730  A+Ox4, RA, NSR, no c/o pain  VSS  Wound care per order   -betadine paint, MIRYAM, education to patient  Vasc S/O  -AVS updated per Vasc  -Education reinforced to patient  -RX faxed/given w/ AVS  All consults clear for DC        NURSING DISCHARGE NOTE    Discharged Home via Wheelchair. Accompanied by Support staff  Belongings Taken by patient/family.

## 2023-07-08 NOTE — PLAN OF CARE
Assumed pt care at [address] [place] [address] for the night shift. Pt resting in bed. Reports some discomfort to rt leg incision. Norco given w/ good relief. Pedal pulses palpable 1+ bilaterally. Rt toe ulcer painted w/betadine,kishan. Pt  up ad han w/ surgical shoe. Plan to dc home in am. Will monitor. Problem: CARDIOVASCULAR - ADULT  Goal: Maintains optimal cardiac output and hemodynamic stability  Description: INTERVENTIONS:  - Monitor vital signs, rhythm, and trends  - Monitor for bleeding, hypotension and signs of decreased cardiac output  - Evaluate effectiveness of vasoactive medications to optimize hemodynamic stability  - Monitor arterial and/or venous puncture sites for bleeding and/or hematoma  - Assess quality of pulses, skin color and temperature  - Assess for signs of decreased coronary artery perfusion - ex.  Angina  - Evaluate fluid balance, assess for edema, trend weights  Outcome: Progressing     Problem: Peripheral vascular status not within defined limits  Goal: Pt will have peripheral vascular status adequate for disch  Outcome: Progressing

## 2023-07-08 NOTE — PLAN OF CARE
Assumed care of pt 0730  A+Ox4, RA, NSR, mild c/o pain   -no PRN requested by patient  VSS, pulses by doppler   -180 SBP x1, PRN to cover   -baseline HTN  DC order pending   -requires vasc clearance  Vascular paged  Pt updated on discharge plan  AVS/med red ready   -will need update with vasc instructions  Call light in reach, needs addressed

## 2023-07-10 ENCOUNTER — PATIENT OUTREACH (OUTPATIENT)
Dept: CASE MANAGEMENT | Age: 73
End: 2023-07-10

## 2023-07-10 NOTE — PROGRESS NOTES
Attempted to contact pt for TCM however no answer. Call continued to ring and did not go to a . [facility] to try again at a later time.

## 2023-07-11 NOTE — PROGRESS NOTES
NCM attempted to contact the patient for HFU. No answer after multiple rings and no VM turned on. NCM will try again another time.

## 2023-07-11 NOTE — PAYOR COMM NOTE
--------------  DISCHARGE REVIEW    Payor: [employer]  Subscriber #:  [id number]  Authorization Number: [id number]    Admit date: 7/5/23  Admit time:   8:49 AM  Discharge Date: 7/8/2023 11:44 AM     Admitting Physician: Maikol Khanna MD  Attending Physician:  No att. providers found  Primary Care Physician: Piotr Chou MD

## 2023-08-15 NOTE — OPERATIVE REPORT
[facility]    PATIENT'S NAME: Estela Piper   ATTENDING PHYSICIAN: Esme Nix M.D. OPERATING PHYSICIAN: Esme Nix M.D. PATIENT ACCOUNT#:   [de-identified]    LOCATION:  [address] [place] [address]  MEDICAL RECORD #:   [id number]       YOB: 1950  ADMISSION DATE:       07/05/2023      OPERATION DATE:  07/05/2023    OPERATIVE REPORT      PREOPERATIVE DIAGNOSIS:  Atherosclerosis of native artery of right lower extremity with ulceration of foot. POSTOPERATIVE DIAGNOSIS:  Atherosclerosis of native artery of right lower extremity with ulceration of foot. PROCEDURE:    1. Angiogram, right iliac. 2.   Balloon angioplasty and stent of the right external iliac artery with a 9 x 60 Protege stent. 3.   Right common femoral artery to popliteal artery bypass with non-reversed in situ saphenous vein. ASSISTANT:  Operating room staff. SPECIMEN:  None. ESTIMATED BLOOD LOSS:  300 mL. INDICATIONS:  This is a 71-year-old male who developed an ulcer on his left first toe a few months ago saphenous graft related. Ulcers progressively worsened. He has also developed significant claudication-type symptoms and some mild rest pain. We are proceeding with revascularization as described below. FINDINGS:  Palpable pulse in bypass graft and Doppler dorsalis pedis signal at completion. OPERATIVE TECHNIQUE:  Patient was taken to the operating room and placed under general anesthesia. The patient was positioned appropriately for a femoral-popliteal bypass as well as iliac stenting. Using ultrasound, I marked the entire length of the saphenous vein and confirmed suitability for a bypass graft. I also marked all the branches. The patient was then prepped and draped in usual sterile fashion. Time-out performed. I made a longitudinal incision in the medial aspect of the lower leg, going through skin, subcutaneous fat and identified the distal saphenous vein.   I then identified all the branches, divided, ligated, and fully mobilized the vein in the proximal lower leg. I then took down the posterior fascia and exposed the popliteal artery and confirmed the popliteal artery was suitable and soft and free of plaque. Based on my ultrasound marks, I made 3 separate counter incisions in the thigh, going through skin and subcutaneous fat, quickly identified the saphenous vein branches and divided and clipped them. I then turned my attention to the groin and made a longitudinal incision in the groin going through skin, subcutaneous fat and quickly identified the common femoral artery. I encircled the common femoral artery with 2 vessel loops. I identified the profunda and SFA as well. I then dissected out the confluence of the saphenous vein with the femoral vein and fully mobilized the saphenous vein, so it would easily reach the distal common femoral artery. The catheterization lab team came in. The patient was systemically heparinized and first we started with iliac stenting. Based on where I was planning to perform the arteriotomy for the bypass, I percutaneously access the distal common femoral artery and placed a micropuncture kit. I then quickly crossed the stenosis with a Glidewire Advantage and then placed a 6-Citizen of [place] sheath. I then did a light ballooning with a 6 mm balloon and then based on angiogram, I selected a 9 x 60 Protege stent that was deployed in the external iliac artery and then profiled with an 8 balloon. We then withdrew the catheter and the wire, and I then tightened the vessel loops on the common femoral artery, the profunda and SFA, and removed the sheath and the wire. At the access point of the sheath, I then extended the arteriotomy. I then disconnected the SFA from the confluence, over sewed the stump with 4-0 Prolene and inspected the confluence of the saphenous vein.   I then identified some proximal valves and with Capone scissors cut the valves and removed them, so there were as no valves that I could identify for at least the first 6 cm of the proximal saphenous vein. Using 6-0 Prolene, I then performed an end-to-side anastomosis of the saphenous vein to the common distal common femoral artery. After completing the anastomosis, I then released all the vessel loops and there was excellent flow into the proximal portion of the bypass graft and we could see visibly were postop approximately 10 cm where there was a valve. A LeMaitre valvulotome was opened. I then disconnected the saphenous vein distally and irrigated with heparinized saline. The LeMaitre valvulotome was used and advanced to the level of the anastomosis and using the valvulotome, I lysed the valves with a series of 4 passes rotating the valvulotome each time for 360, and there was excellent pulsatile flow after the third and I completed the fourth pass. There was excellent pulsatile flow through the bypass graft. I then turned my attention to the distal anastomosis. I then tightened the vessel loops on the popliteal artery and especially the saphenous vein distally, and then performed a longitudinal arteriotomy on the popliteal artery and then using 6-0 Prolene performed an end-to-side anastomosis of the distal saphenous vein to the popliteal artery. Prior to completing the anastomosis, I did standard venting maneuvers proximally and distally, the bypass graft and the artery and then completed the final throws and knots after irrigating the artery with heparinized saline. There was excellent pulsatile flow through the bypass graft. I then inspected the foot and there was excellent signal in the dorsalis pedis. I then inspected the bypass graft and with Doppler interrogation confirmed there were no other valves by compressing the vein and there was some flow where every time I compressed the vein.  Satisfied with this, I appropriately tied off all the vein branches and we then reversed heparin with protamine. I then inspected the wounds for hemostasis and after achieving hemostasis, we then closed the incisions by closing the thigh incisions with interrupted Vicryls and staples. We then closed the lower leg incision with a combination of staples and sutures after closing the subcutaneous tissues of Vicryls. We then closed the groin incision with multiple layers of Vicryls and staples and sutures. There was an easily palpable pulse in the bypass graft. Satisfied with this, we then placed the appropriate dressings on and the patient was awakened from anesthesia and taken to Recovery in stable condition.     Dictated By Elli Chatman M.D.  d: 08/14/2023 22:20:43  t: 08/14/2023 22:44:08  [facility] [id number]  MJG/

## 2023-08-15 NOTE — H&P
Pre-op Diagnosis: ATHEROSCLEROSIS OF NATIVE ARTERY OF RIGHT LOWER EXTREMITY WITH ULCERATION OF OTHER PART OF FOOT    The above referenced H&P was reviewed by Michoacano Lee MD on 8/14/2023, the patient was examined and no significant changes have occurred in the patient's condition since the H&P was performed. I discussed with the patient and/or legal representative the potential benefits, risks and side effects of this procedure; the likelihood of the patient achieving goals; and potential problems that might occur during recuperation. I discussed reasonable alternatives to the procedure, including risks, benefits and side effects related to the alternatives and risks related to not receiving this procedure. We will proceed with procedure as planned.

## 2023-08-25 ENCOUNTER — OFFICE VISIT (OUTPATIENT)
Dept: PODIATRY CLINIC | Facility: CLINIC | Age: 73
End: 2023-08-25

## 2023-08-25 DIAGNOSIS — G89.29 CHRONIC PAIN IN RIGHT FOOT: ICD-10-CM

## 2023-08-25 DIAGNOSIS — M79.671 CHRONIC PAIN IN RIGHT FOOT: ICD-10-CM

## 2023-08-25 DIAGNOSIS — I73.9 PAD (PERIPHERAL ARTERY DISEASE) (HCC): Primary | ICD-10-CM

## 2023-08-25 DIAGNOSIS — I96 GANGRENE (HCC): ICD-10-CM

## 2023-08-25 PROCEDURE — 1159F MED LIST DOCD IN RCRD: CPT | Performed by: STUDENT IN AN ORGANIZED HEALTH CARE EDUCATION/TRAINING PROGRAM

## 2023-08-25 PROCEDURE — 99213 OFFICE O/P EST LOW 20 MIN: CPT | Performed by: STUDENT IN AN ORGANIZED HEALTH CARE EDUCATION/TRAINING PROGRAM

## 2023-08-25 PROCEDURE — 1126F AMNT PAIN NOTED NONE PRSNT: CPT | Performed by: STUDENT IN AN ORGANIZED HEALTH CARE EDUCATION/TRAINING PROGRAM

## 2023-08-25 PROCEDURE — 1160F RVW MEDS BY RX/DR IN RCRD: CPT | Performed by: STUDENT IN AN ORGANIZED HEALTH CARE EDUCATION/TRAINING PROGRAM

## 2023-08-27 NOTE — PROGRESS NOTES
[address] [facility] Podiatry  Progress Note    Danii Hernandez is a 71year old male. Patient presents with: Follow - Up: Right great toe wound, denies fever, chills or pain        HPI:     Patient is a pleasant 71-year-old male with past medical history of PAD and tobacco use was seen in clinic for follow-up of small area of gangrene to his right hallux. Patient has undergone right lower extremity bypass with Dr. Alejandro Almodovar on 7/5/2023. He has been painting site with Betadine since discharge from hospital.  He presents today for reevaluation. He believes site is doing well. No other complaints are mentioned. Past medical history, medications, and allergies reviewed. Allergies: Patient has no known allergies. Current Outpatient Medications   Medication Sig Dispense Refill    HYDROcodone-acetaminophen 5-325 MG Oral Tab Take 1-2 tablets by mouth every 4 (four) hours as needed for Pain. 60 tablet 0    clopidogrel 75 MG Oral Tab Take 1 tablet (75 mg total) by mouth daily. 90 tablet 0    losartan 25 MG Oral Tab Take 1 tablet (25 mg total) by mouth daily. 30 tablet 0    atorvastatin 40 MG Oral Tab Take 1 tablet (40 mg total) by mouth nightly. 30 tablet 0    TART CHERRY OR Take 1 tablet by mouth daily as needed. aspirin 81 MG Oral Tab EC Take 1 tablet (81 mg total) by mouth daily. Multiple Vitamin (DAILY VITAMIN) Oral Tab Take by mouth.         Past Medical History:   Diagnosis Date    IBS (irritable bowel syndrome)     Peripheral vascular disease ([address]       Past Surgical History:   Procedure Laterality Date    COLONOSCOPY N/A 7/16/2021    Procedure: COLONOSCOPY with biopsies;  Surgeon: Desiree Howard DO;  Location: [address] [place] [address] ENDOSCOPY    OTHER  1997     right knee scope      Family History   Problem Relation Age of Onset    Heart Disease Mother     Heart Disease Father     Prostate Cancer Father       Social History    Socioeconomic History      Marital status:     Tobacco Use      Smoking status: Every Day        Packs/day: 0.25        Years: 26.00        Pack years: 6.5        Types: Cigarettes      Smokeless tobacco: Never    Vaping Use      Vaping Use: Never used    Substance and Sexual Activity      Alcohol use: Yes        Alcohol/week: 2.0 standard drinks of alcohol        Types: 2 Standard drinks or equivalent per week        Comment: social      Drug use: Yes        Types: Cannabis        Comment: uses smoke daily          REVIEW OF SYSTEMS:     Today reviewed systems as documented below  GENERAL HEALTH: feels well otherwise  SKIN: denies any unusual skin lesions or rashes  RESPIRATORY: denies shortness of breath with exertion  CARDIOVASCULAR: denies chest pain on exertion  GI: denies abdominal pain and denies heartburn  NEURO: denies headaches  MUSCULO: denies arthritis, back pain      EXAM:   There were no vitals taken for this visit. GENERAL: well developed, well nourished, in no apparent distress  EXTREMITIES:       1. Integument: Normal skin temperature and turgor. Area of gangrene to medial aspect of right great toe easily lifted off to reveal healthy underlying skin. No signs of infection or other concerns appreciated. 2. Vascular: Unable to palpate pedal pulses. 3. Musculoskeletal: All muscle groups are graded 5 out of 5 in the foot and ankle. No major pain noted to lower extremities. 4. Neurological: Normal sharp dull sensation; reflexes normal.          ASSESSMENT AND PLAN:   Diagnoses and all orders for this visit:    PAD (peripheral artery disease) (HCC)    Gangrene (HCC)    Chronic pain in right foot        Plan:     -Patient examined, chart history reviewed. -Patient has done excellent job keeping gangrene dry and stable and pain site with Betadine daily. Today site was inspected and noted to easily lift off skin to reveal healthy underlying skin. No need for further dressing changes at this time. Patient can resume normal bathing routine.   Discussed the patient that he is still at high risk for skin breakdown or other concerns and he should still check feet daily.  -Patient should continue following with Dr. Patricia Mccain as he is status post lower extremity bypass.  -Educated patient acute signs of infection advised patient seek [facility] if any concerns arise.  -Plan to follow-up in 2 months for routine foot care or sooner if any other concerns arise. The patient indicates understanding of these issues and agrees to the plan. Return in about 2 months (around 10/25/2023) for routine foot care.     Sayra Wang DPM

## 2023-08-27 NOTE — PATIENT INSTRUCTIONS
Okay to resume normal bathing routine. Continue to follow with Dr. Tiera Wall. 2 months for routine foot care or sooner if any other concerns arise.

## 2023-09-05 ENCOUNTER — TELEPHONE (OUTPATIENT)
Dept: INTERNAL MEDICINE CLINIC | Facility: CLINIC | Age: 73
End: 2023-09-05

## 2023-09-06 RX ORDER — LOSARTAN POTASSIUM 25 MG/1
25 TABLET ORAL DAILY
Qty: 90 TABLET | Refills: 0 | Status: SHIPPED | OUTPATIENT
Start: 2023-09-06

## 2023-09-06 RX ORDER — ATORVASTATIN CALCIUM 40 MG/1
40 TABLET, FILM COATED ORAL NIGHTLY
Qty: 90 TABLET | Refills: 0 | Status: SHIPPED | OUTPATIENT
Start: 2023-09-06

## 2023-09-06 NOTE — TELEPHONE ENCOUNTER
Reached patient for medication adherence consult. Patient overdue for refill on atorvastatin and losartan. Patient reports that he is not taking either of these medications because he ran out. Patient states that he received a 30 day supply of medication after his recent hospitalization. Patient had peripheral bypass for his PAD. Patient states that he has not followed up with his PCP. He also reports that he has not checked his blood pressure. Will pend orders for losartan and atorvastatin for PCP to review. Discussed importance of follow up to continue medications and importance of adherence to these medications to prevent further complications. Patient denies any questions or concerns with medication.

## 2023-12-01 RX ORDER — LOSARTAN POTASSIUM 25 MG/1
25 TABLET ORAL DAILY
Qty: 90 TABLET | Refills: 0 | Status: SHIPPED | OUTPATIENT
Start: 2023-12-01

## 2023-12-01 RX ORDER — ATORVASTATIN CALCIUM 40 MG/1
40 TABLET, FILM COATED ORAL NIGHTLY
Qty: 90 TABLET | Refills: 0 | Status: SHIPPED | OUTPATIENT
Start: 2023-12-01

## 2024-02-12 ENCOUNTER — MED REC SCAN ONLY (OUTPATIENT)
Dept: INTERNAL MEDICINE CLINIC | Facility: CLINIC | Age: 74
End: 2024-02-12

## 2024-03-04 RX ORDER — LOSARTAN POTASSIUM 25 MG/1
25 TABLET ORAL DAILY
Qty: 90 TABLET | Refills: 0 | Status: SHIPPED | OUTPATIENT
Start: 2024-03-04

## 2024-03-04 RX ORDER — ATORVASTATIN CALCIUM 40 MG/1
40 TABLET, FILM COATED ORAL NIGHTLY
Qty: 90 TABLET | Refills: 0 | Status: SHIPPED | OUTPATIENT
Start: 2024-03-04

## 2024-03-11 ENCOUNTER — TELEPHONE (OUTPATIENT)
Dept: INTERNAL MEDICINE CLINIC | Facility: CLINIC | Age: 74
End: 2024-03-11

## 2024-03-11 DIAGNOSIS — Z12.5 SCREENING FOR MALIGNANT NEOPLASM OF PROSTATE: ICD-10-CM

## 2024-03-11 DIAGNOSIS — Z00.00 ROUTINE GENERAL MEDICAL EXAMINATION AT A HEALTH CARE FACILITY: Primary | ICD-10-CM

## 2024-03-11 DIAGNOSIS — I10 ESSENTIAL HYPERTENSION: ICD-10-CM

## 2024-04-22 ENCOUNTER — OFFICE VISIT (OUTPATIENT)
Dept: INTERNAL MEDICINE CLINIC | Facility: CLINIC | Age: 74
End: 2024-04-22
Payer: MEDICARE

## 2024-04-22 DIAGNOSIS — Z00.00 ENCOUNTER FOR ANNUAL HEALTH EXAMINATION: Primary | ICD-10-CM

## 2024-04-22 DIAGNOSIS — I73.9 PAD (PERIPHERAL ARTERY DISEASE) (HCC): ICD-10-CM

## 2024-04-22 DIAGNOSIS — I10 ESSENTIAL HYPERTENSION: ICD-10-CM

## 2024-04-22 DIAGNOSIS — I70.239 ATHEROSCLEROSIS OF NATIVE ARTERY OF RIGHT LOWER EXTREMITY WITH ULCERATION, UNSPECIFIED ULCERATION SITE (HCC): ICD-10-CM

## 2024-04-22 DIAGNOSIS — F17.200 TOBACCO DEPENDENCE: ICD-10-CM

## 2024-04-22 NOTE — PROGRESS NOTES
Subjective:   Lawrence Patel is a 73 year old male who presents for a Medicare Subsequent Annual Wellness visit (Pt already had Initial Annual Wellness) and scheduled follow up of multiple significant but stable problems.     Since last evaluation the patient underwent right femoral-popliteal bypass for symptomatic PAD, offering resolution of symptoms. He reports no pain, even on ambulation. Laboratory evaluation is pending collection. No acute concerns.    History/Other:   Fall Risk Assessment:   He has been screened for Falls and is low risk.      Cognitive Assessment:   He had a completely normal cognitive assessment - see flowsheet entries     Functional Ability/Status:   Lawrence Patel has some abnormal functions as listed below:  He has Hearing problems based on screening of functional status.      Depression Screening (PHQ-2/PHQ-9): PHQ-2 SCORE: 0  , done 4/22/2024        5 minutes spent screening and counseling for depression    Advanced Directives:   He does NOT have a Living Will. [Do you have a living will?: No]  He does NOT have a Power of  for Health Care. [Do you have a healthcare power of ?: No]  Discussed Advance Care Planning with patient (and family/surrogate if present). Standard forms made available to patient in After Visit Summary.      Patient Active Problem List   Diagnosis    Nicotine dependence    Diarrhea    Chronic pain in right foot    PAD (peripheral artery disease) (HCC)    Gangrene (HCC)    Essential hypertension     Allergies:  He has No Known Allergies.    Current Medications:  Outpatient Medications Marked as Taking for the 4/22/24 encounter (Office Visit) with Avery Rios MD   Medication Sig    atorvastatin 40 MG Oral Tab Take 1 tablet (40 mg total) by mouth nightly.    losartan 25 MG Oral Tab Take 1 tablet (25 mg total) by mouth daily.    HYDROcodone-acetaminophen 5-325 MG Oral Tab Take 1-2 tablets by mouth every 4 (four) hours as needed for Pain.    aspirin  81 MG Oral Tab EC Take 1 tablet (81 mg total) by mouth daily.    Multiple Vitamin (DAILY VITAMIN) Oral Tab Take by mouth.       Medical History:  He  has a past medical history of IBS (irritable bowel syndrome) and Peripheral vascular disease (HCC).  Surgical History:  He  has a past surgical history that includes other (1997) and colonoscopy (N/A, 7/16/2021).   Family History:  His family history includes Heart Disease in his father and mother; Prostate Cancer in his father.  Social History:  He  reports that he has been smoking cigarettes. He has a 6.5 pack-year smoking history. He has never used smokeless tobacco. He reports current alcohol use of about 2.0 standard drinks of alcohol per week. He reports current drug use. Drug: Cannabis.    Tobacco:  He currently smokes tobacco.  Social History     Tobacco Use   Smoking Status Every Day    Current packs/day: 0.25    Average packs/day: 0.3 packs/day for 26.0 years (6.5 ttl pk-yrs)    Types: Cigarettes   Smokeless Tobacco Never      Tobacco Cessation Documentation (Smoking and Smokeless included):  I had an in depth therapy session with Lawrence Patel about his tobacco use risks and options using the USPSTF's Five A's approach:    Ask: Lawrence is using tobacco products.  Assess: We asked about/assessed behavioral health risk and factors affecting choice of behavior change goals/methods.  Specifically I asked about readiness to quit tobacco.  Advise: We gave clear, specific, and personalized behavior change advice, including information about personal health harms and benefits. Specifically, he was told that Quitting tobacco is one of the best things he can do for his health. I strongly encouraged him to quit.      Agree: We collaboratively selected appropriate treatment goals and methods based on the patient’s interest in and willingness to change the behavior.   Assist: We used behavior change techniques (self-help and/or counseling), to aid Lawrence in achieving  agreed-upon goals by acquiring the skills, confidence, and social/environmental supports for behavior change, supplemented with adjunctive medical treatments when appropriate. Additionally, national quitting tobacco aides were discussed.   Arrange: Follow up at next visit- see specific follow up below.    Time Counseled: <1 minutes       CAGE Alcohol Screen:   He has been screened for alcohol abuse and his score is not 0:  Cut: Have you ever felt you should Cut down on your drinking?: No  Annoyed: Have people Annoyed you by criticizing your drinking?: No  Guilty: Have you ever felt bad or Guilty about your drinking?: Yes  Eye Opener: Have you ever had a drink first thing in the morning to steady your nerves or to get rid of a hangover (Eye opener)?: No  Total Score: 1      Patient Care Team:  Avery Rios MD as PCP - General (Internal Medicine)  Josué Akers MD (GASTROENTEROLOGY)  Jaqueline Harrington PA-C (Physician Assistant)    Review of Systems  Constitutional: negative  Eyes: negative  Ears, nose, mouth, throat, and face: negative  Respiratory: negative  Cardiovascular: negative  Gastrointestinal: negative  Genitourinary:negative  Integument/breast: negative  Hematologic/lymphatic: negative  Musculoskeletal:negative  Neurological: negative  Behavioral/Psych: negative  Endocrine: negative  Allergic/Immunologic: negative    Objective:   Physical Exam  General Appearance:  Alert, cooperative, no distress, appears stated age   Head:  Normocephalic, without obvious abnormality, atraumatic   Eyes:  BL conjunctiva WNL   Ears:  TM WNL BL   Nose: Deferred   Throat: Deferred   Neck: Supple, symmetrical, trachea midline, no adenopathy, thyroid: not enlarged, symmetric, no tenderness/mass/nodules, no carotid bruit or JVD   Back:   Symmetric, no curvature, ROM normal, no CVA tenderness   Lungs:   Clear to auscultation bilaterally, respirations unlabored   Chest Wall:  No tenderness or deformity   Heart:  Regular rate  and rhythm, S1, S2 normal, no murmur, rub or gallop   Abdomen:   Soft, non-tender, bowel sounds active all four quadrants,  no masses, no organomegaly   Genitalia: Deferred   Rectal: Deferred   Extremities: No edema   Pulses: 2+ and symmetric   Skin: Skin color, texture, turgor normal, no rashes or lesions   Lymph nodes: Cervical nodes normal   Neurologic: Grossly normal     /70 (BP Location: Left arm, Patient Position: Sitting, Cuff Size: adult)   Pulse 58   Temp 97.1 °F (36.2 °C) (Skin)   Resp 16   Ht 5' 10\" (1.778 m)   Wt 143 lb 12.8 oz (65.2 kg)   SpO2 97%   BMI 20.63 kg/m²  Estimated body mass index is 20.63 kg/m² as calculated from the following:    Height as of this encounter: 5' 10\" (1.778 m).    Weight as of this encounter: 143 lb 12.8 oz (65.2 kg).    Medicare Hearing Assessment:   Hearing Screening    Time taken: 4/22/2024  9:52 AM  Entry User: Avery Rios MD  Screening Method: Whisper Test  Whisper Test Result: Pass         Visual Acuity:   Right Eye Visual Acuity: Corrected Right Eye Chart Acuity: 20/30   Left Eye Visual Acuity: Corrected Left Eye Chart Acuity: 20/30   Both Eyes Visual Acuity: Corrected Both Eyes Chart Acuity: 20/30   Able To Tolerate Visual Acuity: Yes        Assessment & Plan:   Lawrence Patel is a 73 year old male who presents for a Medicare Assessment.     Outstanding screening and preventive measures:  Immunizations: declined at this time  Shingles and pneumococcal immunizations: to be obtained from pharmacy    Tobacco dependence:  5 cigarettes daily  Declined resources to quit smoking    Pure hypercholesterolemia and PAD:  PAD of LLE with ulceration; following with vascular surgery service  CMP and lipid panel pending collection  Continue atorvastatin 40 mg daily    Hypertension:  Stable and controlled  Continue losartan 25 mg daily    1. Encounter for annual health examination (Primary)  The patient indicates understanding of these issues and agrees to the  plan.  Reinforced healthy diet, lifestyle, and exercise.      Return in 1 year (on 4/22/2025).     Avery Rios MD, 4/22/2024     Supplementary Documentation:   General Health:  In the past six months, have you lost more than 10 pounds without trying?: 2 - No  Has your appetite been poor?: No  Type of Diet: Other  How does the patient maintain a good energy level?: Daily Walks  How would you describe your daily physical activity?: Light  How would you describe your current health state?: Fair  How do you maintain positive mental well-being?: Visiting Family  On a scale of 0 to 10, with 0 being no pain and 10 being severe pain, what is your pain level?: 4 - (Moderate)  In the past six months, have you experienced urine leakage?: 0-No  At any time do you feel concerned for the safety/well-being of yourself and/or your children, in your home or elsewhere?: No  Have you had any immunizations at another office such as Influenza, Hepatitis B, Tetanus, or Pneumococcal?: No        Lawrence Patel's SCREENING SCHEDULE   Tests on this list are recommended by your physician but may not be covered, or covered at this frequency, by your insurer.   Please check with your insurance carrier before scheduling to verify coverage.   PREVENTATIVE SERVICES FREQUENCY &  COVERAGE DETAILS LAST COMPLETION DATE   Diabetes Screening    Fasting Blood Sugar / Glucose    One screening every 12 months if never tested or if previously tested but not diagnosed with pre-diabetes   One screening every 6 months if diagnosed with pre-diabetes Lab Results   Component Value Date     (H) 07/06/2023        Cardiovascular Disease Screening    Lipid Panel  Cholesterol  Lipoprotein (HDL)  Triglycerides Covered every 5 years for all Medicare beneficiaries without apparent signs or symptoms of cardiovascular disease Lab Results   Component Value Date    CHOLEST 178 07/06/2023    HDL 37 (L) 07/06/2023     (H) 07/06/2023    TRIG 70 07/06/2023          Electrocardiogram (EKG)   Covered if needed at Welcome to Medicare, and non-screening if indicated for medical reasons 07/08/2023      Ultrasound Screening for Abdominal Aortic Aneurysm (AAA) Covered once in a lifetime for one of the following risk factors    Men who are 65-75 years old and have ever smoked    Anyone with a family history -     Colorectal Cancer Screening  Covered for ages 50-85; only need ONE of the following:    Colonoscopy   Covered every 10 years    Covered every 2 years if patient is at high risk or previous colonoscopy was abnormal 07/16/2021    Health Maintenance   Topic Date Due    Colorectal Cancer Screening  07/16/2031       Flexible Sigmoidoscopy   Covered every 4 years -    Fecal Occult Blood Test Covered annually -   Prostate Cancer Screening    Prostate-Specific Antigen (PSA) Annually No results found for: \"PSA\"  Health Maintenance   Topic Date Due    PSA  04/13/2025      Immunizations    Influenza Covered once per flu season  Please get every year -  No recommendations at this time    Pneumococcal Each vaccine ([id number] & [id number]) covered once after 65 Prevnar 13: -    [id number]: 05/19/2021     Pneumococcal Vaccination(2 of 2 - PCV) due on 05/19/2022    Hepatitis B One screening covered for patients with certain risk factors   -  No recommendations at this time    Tetanus Toxoid Not covered by Medicare Part B unless medically necessary (cut with metal); may be covered with your pharmacy prescription benefits -    Tetanus, Diptheria and Pertusis TD and TDaP Not covered by Medicare Part B -  No recommendations at this time    Zoster Not covered by Medicare Part B; may be covered with your pharmacy  prescription benefits -  Zoster Vaccines(1 of 2) Never done     Annual Monitoring of Persistent Medications (ACE/ARB, digoxin diuretics, anticonvulsants)    Potassium Annually Lab Results   Component Value Date    K 4.2 07/06/2023         Creatinine   Annually Lab Results    Component Value Date    CREATSERUM 0.84 07/06/2023         BUN Annually Lab Results   Component Value Date    BUN 13 07/06/2023       Drug Serum Conc Annually No results found for: \"DIGOXIN\", \"DIG\", \"VALP\"

## 2024-04-22 NOTE — PATIENT INSTRUCTIONS
Lawrence Patel's SCREENING SCHEDULE   Tests on this list are recommended by your physician but may not be covered, or covered at this frequency, by your insurer.   Please check with your insurance carrier before scheduling to verify coverage.   PREVENTATIVE SERVICES FREQUENCY &  COVERAGE DETAILS LAST COMPLETION DATE   Diabetes Screening    Fasting Blood Sugar / Glucose    One screening every 12 months if never tested or if previously tested but not diagnosed with pre-diabetes   One screening every 6 months if diagnosed with pre-diabetes Lab Results   Component Value Date     (H) 07/06/2023        Cardiovascular Disease Screening    Lipid Panel  Cholesterol  Lipoprotein (HDL)  Triglycerides Covered every 5 years for all Medicare beneficiaries without apparent signs or symptoms of cardiovascular disease Lab Results   Component Value Date    CHOLEST 178 07/06/2023    HDL 37 (L) 07/06/2023     (H) 07/06/2023    TRIG 70 07/06/2023         Electrocardiogram (EKG)   Covered if needed at Welcome to Medicare, and non-screening if indicated for medical reasons 07/08/2023      Ultrasound Screening for Abdominal Aortic Aneurysm (AAA) Covered once in a lifetime for one of the following risk factors   • Men who are 65-75 years old and have ever smoked   • Anyone with a family history -     Colorectal Cancer Screening  Covered for ages 50-85; only need ONE of the following:    Colonoscopy   Covered every 10 years    Covered every 2 years if patient is at high risk or previous colonoscopy was abnormal 07/16/2021    Health Maintenance   Topic Date Due   • Colorectal Cancer Screening  07/16/2031       Flexible Sigmoidoscopy   Covered every 4 years -    Fecal Occult Blood Test Covered annually -   Prostate Cancer Screening    Prostate-Specific Antigen (PSA) Annually No results found for: \"PSA\"  Health Maintenance   Topic Date Due   • PSA  04/13/2025      Immunizations    Influenza Covered once per flu season  Please  get every year -  No recommendations at this time    Pneumococcal Each vaccine (Iiazfay25 & Qhzddqtdu10) covered once after 65 Prevnar 13: -    Kykezhwas16: 05/19/2021     Pneumococcal Vaccination(2 of 2 - PCV) due on 05/19/2022    Hepatitis B One screening covered for patients with certain risk factors   -  No recommendations at this time    Tetanus Toxoid Not covered by Medicare Part B unless medically necessary (cut with metal); may be covered with your pharmacy prescription benefits -    Tetanus, Diptheria and Pertusis TD and TDaP Not covered by Medicare Part B -  No recommendations at this time    Zoster Not covered by Medicare Part B; may be covered with your pharmacy  prescription benefits -  Zoster Vaccines(1 of 2) Never done     Annual Monitoring of Persistent Medications (ACE/ARB, digoxin diuretics, anticonvulsants)    Potassium Annually Lab Results   Component Value Date    K 4.2 07/06/2023         Creatinine   Annually Lab Results   Component Value Date    CREATSERUM 0.84 07/06/2023         BUN Annually Lab Results   Component Value Date    BUN 13 07/06/2023       Drug Serum Conc Annually No results found for: \"DIGOXIN\", \"DIG\", \"VALP\"

## 2024-04-28 VITALS
BODY MASS INDEX: 20.59 KG/M2 | OXYGEN SATURATION: 97 % | HEIGHT: 70 IN | HEART RATE: 58 BPM | TEMPERATURE: 97 F | DIASTOLIC BLOOD PRESSURE: 74 MMHG | WEIGHT: 143.81 LBS | SYSTOLIC BLOOD PRESSURE: 138 MMHG | RESPIRATION RATE: 16 BRPM

## 2024-04-28 PROBLEM — R19.7 DIARRHEA: Status: RESOLVED | Noted: 2021-06-23 | Resolved: 2024-04-28

## 2024-04-28 PROBLEM — I96 GANGRENE (HCC): Status: RESOLVED | Noted: 2023-07-06 | Resolved: 2024-04-28

## 2024-04-28 PROBLEM — M79.671 CHRONIC PAIN IN RIGHT FOOT: Status: RESOLVED | Noted: 2023-04-04 | Resolved: 2024-04-28

## 2024-04-28 PROBLEM — G89.29 CHRONIC PAIN IN RIGHT FOOT: Status: RESOLVED | Noted: 2023-04-04 | Resolved: 2024-04-28

## 2024-06-03 RX ORDER — LOSARTAN POTASSIUM 25 MG/1
25 TABLET ORAL DAILY
Qty: 90 TABLET | Refills: 3 | Status: SHIPPED | OUTPATIENT
Start: 2024-06-03

## 2024-06-03 RX ORDER — ATORVASTATIN CALCIUM 40 MG/1
40 TABLET, FILM COATED ORAL NIGHTLY
Qty: 90 TABLET | Refills: 3 | Status: SHIPPED | OUTPATIENT
Start: 2024-06-03

## 2024-06-03 NOTE — TELEPHONE ENCOUNTER
REFILL PASSED PER [facility] PROTOCOLS    Requested Prescriptions   Pending Prescriptions Disp Refills    ATORVASTATIN 40 MG Oral Tab [Pharmacy Med Name: ATORVASTATIN 40 MG TABLET] 90 tablet 0     Sig: TAKE 1 TABLET BY MOUTH EVERY DAY AT NIGHT       Cholesterol Medication Protocol Passed - 5/31/2024 12:15 AM        Passed - ALT < 80     Lab Results   Component Value Date    ALT 23 07/03/2023             Passed - ALT resulted within past year        Passed - Lipid panel within past 12 months     Lab Results   Component Value Date    CHOLEST 178 07/06/2023    TRIG 70 07/06/2023    HDL 37 (L) 07/06/2023     (H) 07/06/2023    VLDL 12 07/06/2023    NONHDLC 141 (H) 07/06/2023             Passed - In person appointment or virtual visit in the past 12 mos or appointment in next 3 mos     Recent Outpatient Visits              1 month ago Encounter for annual health examination    [address]    Office Visit    9 months ago PAD (peripheral artery disease) ([facility])    [facility] Olvin Beard DPM    Office Visit    1 year ago Claudication ([facility])    [address] Jaqueline Harrington PA-C    Office Visit    1 year ago Encounter for annual health examination    [address] Avery Jaeger MD    Office Visit    1 year ago Elevated alkaline phosphatase level    [address]    Office Visit                        LOSARTAN 25 MG Oral Tab [Pharmacy Med Name: LOSARTAN POTASSIUM 25 MG TAB] 90 tablet 0     Sig: Take 1 tablet (25 mg total) by mouth daily.       Hypertension Medications Protocol Passed - 5/31/2024 12:15 AM        Passed - CMP or BMP in past 12 months        Passed - Last BP reading less than 140/90     BP Readings from Last 1 Encounters:   04/28/24 138/74                Passed - In person appointment or virtual visit in the past 12 mos or appointment in next 3 mos     Recent Outpatient Visits              1 month ago Encounter for annual health examination    [facility], [address] [place], [place] [address]Dannie Amish, MD    Office Visit    9 months ago PAD (peripheral artery disease) (HCC)    [facility], [facility] Olvin Mendez DPM    Office Visit    1 year ago Claudication ([facility])    [facility] [address] [place], [place] [address]Dannie Christina, PA-C    Office Visit    1 year ago Encounter for annual health examination    [facility], [address] [place], [place] [address]Dannie Amish, MD    Office Visit    1 year ago Elevated alkaline phosphatase level    [facility] [address] [place], [place] [address]Dannie Amish, MD    Office Visit                      Passed - EGFRCR or GFRAA > 50     GFR Evaluation  EGFRCR: 93 , resulted on 7/6/2023                 Recent Outpatient Visits              1 month ago Encounter for annual health examination    [facility] [address] [place], [place] [address]Dannie Amish, MD    Office Visit    9 months ago PAD (peripheral artery disease) ([facility])    [facility], [facility] Olvin Mendez DPM    Office Visit    1 year ago Claudication ([facility])    [facility], [address] [place], [place] [address]Dannie Christina, PA-C    Office Visit    1 year ago Encounter for annual health examination    [facility], [address] [place], [place] [address]Dannie Amish, MD    Office Visit    1 year ago Elevated alkaline phosphatase level    [facility] [address] [place], [place] [address]Dannie Amish, MD    Office Visit

## 2024-11-04 NOTE — PAT NURSING NOTE
PAT call with patient. The following instructions were given and sent through his My Chart. Questions were answered and he verbalized understanding.    PreOp Instructions    You are scheduled for: a Peripheral Procedure    Date of Procedure: 11/06/24    Diet Instructions: Do not eat or drink anything after midnight including gum, mints, candy, etc.    Medications: Take Aspirin 81 mg x 4 tablets the day of your procedure, Medications you are allowed to take can be taken with a sip of water the morning of your procedure    Medications to Stop: Hold herbal supplements and vitamins    Other Medications: Take Losartan the morning of the procedure with a sip of water. Avoid smoking Marijuana from now until after the procedure.    Skin Prep : Shower with antibacterial soap using a clean washcloth, prior to procedure. Once dried off, no lotions/powders/creams/ointments, etc., Do not shave the procedure area, this will be completed at the hospital during the preparation phase.    Arrival Time: The day prior to your procedure you will receive a phone call before 6:00 pm with your arrival time. If you haven't received a phone call, please check your voicemail messages., If you did not receive a voice mail and it is after 6:00 pm, please call the nursing supervisor at [phone].    Driving After Procedure: Sedation will be given so you WILL NOT be able to drive home. You will need a responsible adult  to drive you home. You can NOT take uber or taxi unless approved by your physician in advance.    Discharge Teaching: Your nurse will give you specific instructions before discharge, Any questions, please call the physician's office      [employer] parking is available starting at 6 am or [facility]. Check in at the [facility] reception desk. Our  will be there to check you in for your procedure. Please bring your insurance cards and ID with you.                                                                                                                                       Please DO NOT respond to this message, the inbasket is not monitored for messages. For any questions, please call the physician's office.

## 2024-11-06 ENCOUNTER — HOSPITAL ENCOUNTER (OUTPATIENT)
Dept: INTERVENTIONAL RADIOLOGY/VASCULAR | Facility: HOSPITAL | Age: 74
Discharge: HOME | End: 2024-11-06
Attending: SURGERY | Admitting: SURGERY
Payer: MEDICARE

## 2024-11-06 VITALS
RESPIRATION RATE: 12 BRPM | SYSTOLIC BLOOD PRESSURE: 127 MMHG | DIASTOLIC BLOOD PRESSURE: 57 MMHG | HEART RATE: 63 BPM | BODY MASS INDEX: 18.81 KG/M2 | WEIGHT: 127 LBS | HEIGHT: 69 IN | TEMPERATURE: 97 F | OXYGEN SATURATION: 99 %

## 2024-11-06 DIAGNOSIS — I70.235 ATHSCL NATIVE ARTERIES OF RIGHT LEG W ULCER OTH PRT FOOT (HCC): ICD-10-CM

## 2024-11-06 PROCEDURE — 047T3ZZ DILATION OF RIGHT PERONEAL ARTERY, PERCUTANEOUS APPROACH: ICD-10-PCS | Performed by: SURGERY

## 2024-11-06 PROCEDURE — 047P3ZZ DILATION OF RIGHT ANTERIOR TIBIAL ARTERY, PERCUTANEOUS APPROACH: ICD-10-PCS | Performed by: SURGERY

## 2024-11-06 PROCEDURE — 99153 MOD SED SAME PHYS/QHP EA: CPT | Performed by: SURGERY

## 2024-11-06 PROCEDURE — 75710 ARTERY X-RAYS ARM/LEG: CPT | Performed by: SURGERY

## 2024-11-06 PROCEDURE — 37232 HC TRANSL ANGIOPLASTY TIBIAL PERONEAL UNIL EA ADDL: CPT | Performed by: SURGERY

## 2024-11-06 PROCEDURE — 047M3ZZ DILATION OF RIGHT POPLITEAL ARTERY, PERCUTANEOUS APPROACH: ICD-10-PCS | Performed by: SURGERY

## 2024-11-06 PROCEDURE — 37228 HC TRANSLUMINAL ANGIO TIBIAL PERONEAL UNILAT INIT: CPT | Performed by: SURGERY

## 2024-11-06 PROCEDURE — 99152 MOD SED SAME PHYS/QHP 5/>YRS: CPT | Performed by: SURGERY

## 2024-11-06 RX ORDER — CLOPIDOGREL BISULFATE 75 MG/1
TABLET ORAL
Status: COMPLETED
Start: 2024-11-06 | End: 2024-11-06

## 2024-11-06 RX ORDER — HYDROCODONE BITARTRATE AND ACETAMINOPHEN 10; 325 MG/1; MG/1
2 TABLET ORAL EVERY 6 HOURS PRN
Status: DISCONTINUED | OUTPATIENT
Start: 2024-11-06 | End: 2024-11-06

## 2024-11-06 RX ORDER — IODIXANOL 320 MG/ML
100 INJECTION, SOLUTION INTRAVASCULAR
Status: COMPLETED | OUTPATIENT
Start: 2024-11-06 | End: 2024-11-06

## 2024-11-06 RX ORDER — MIDAZOLAM HYDROCHLORIDE 1 MG/ML
INJECTION INTRAMUSCULAR; INTRAVENOUS
Status: COMPLETED
Start: 2024-11-06 | End: 2024-11-06

## 2024-11-06 RX ORDER — HYDROMORPHONE HYDROCHLORIDE 1 MG/ML
0.8 INJECTION, SOLUTION INTRAMUSCULAR; INTRAVENOUS; SUBCUTANEOUS EVERY 2 HOUR PRN
Status: DISCONTINUED | OUTPATIENT
Start: 2024-11-06 | End: 2024-11-06

## 2024-11-06 RX ORDER — SODIUM CHLORIDE 9 MG/ML
INJECTION, SOLUTION INTRAVENOUS CONTINUOUS
Status: DISCONTINUED | OUTPATIENT
Start: 2024-11-06 | End: 2024-11-06

## 2024-11-06 RX ORDER — HYDRALAZINE HYDROCHLORIDE 20 MG/ML
INJECTION INTRAMUSCULAR; INTRAVENOUS
Status: COMPLETED
Start: 2024-11-06 | End: 2024-11-06

## 2024-11-06 RX ORDER — HEPARIN SODIUM 5000 [USP'U]/ML
INJECTION, SOLUTION INTRAVENOUS; SUBCUTANEOUS
Status: COMPLETED
Start: 2024-11-06 | End: 2024-11-06

## 2024-11-06 RX ORDER — HYDROMORPHONE HYDROCHLORIDE 1 MG/ML
0.2 INJECTION, SOLUTION INTRAMUSCULAR; INTRAVENOUS; SUBCUTANEOUS EVERY 2 HOUR PRN
Status: DISCONTINUED | OUTPATIENT
Start: 2024-11-06 | End: 2024-11-06

## 2024-11-06 RX ORDER — CLOPIDOGREL BISULFATE 75 MG/1
300 TABLET ORAL DAILY
Status: DISCONTINUED | OUTPATIENT
Start: 2024-11-06 | End: 2024-11-06

## 2024-11-06 RX ORDER — CLOPIDOGREL BISULFATE 75 MG/1
TABLET ORAL
Status: DISCONTINUED
Start: 2024-11-06 | End: 2024-11-06 | Stop reason: WASHOUT

## 2024-11-06 RX ORDER — HYDRALAZINE HYDROCHLORIDE 20 MG/ML
10 INJECTION INTRAMUSCULAR; INTRAVENOUS ONCE
Status: COMPLETED | OUTPATIENT
Start: 2024-11-06 | End: 2024-11-06

## 2024-11-06 RX ORDER — HYDROMORPHONE HYDROCHLORIDE 1 MG/ML
0.4 INJECTION, SOLUTION INTRAMUSCULAR; INTRAVENOUS; SUBCUTANEOUS EVERY 2 HOUR PRN
Status: DISCONTINUED | OUTPATIENT
Start: 2024-11-06 | End: 2024-11-06

## 2024-11-06 RX ORDER — HYDROCODONE BITARTRATE AND ACETAMINOPHEN 10; 325 MG/1; MG/1
1 TABLET ORAL EVERY 6 HOURS PRN
Status: DISCONTINUED | OUTPATIENT
Start: 2024-11-06 | End: 2024-11-06

## 2024-11-06 RX ORDER — NITROGLYCERIN 20 MG/100ML
INJECTION INTRAVENOUS
Status: COMPLETED
Start: 2024-11-06 | End: 2024-11-06

## 2024-11-06 RX ORDER — LIDOCAINE HYDROCHLORIDE 10 MG/ML
INJECTION, SOLUTION EPIDURAL; INFILTRATION; INTRACAUDAL; PERINEURAL
Status: COMPLETED
Start: 2024-11-06 | End: 2024-11-06

## 2024-11-06 RX ORDER — CLOPIDOGREL BISULFATE 75 MG/1
300 TABLET ORAL ONCE
Status: DISCONTINUED | OUTPATIENT
Start: 2024-11-06 | End: 2024-11-06

## 2024-11-06 RX ORDER — HYDROMORPHONE HYDROCHLORIDE 1 MG/ML
INJECTION, SOLUTION INTRAMUSCULAR; INTRAVENOUS; SUBCUTANEOUS
Status: COMPLETED
Start: 2024-11-06 | End: 2024-11-06

## 2024-11-06 RX ORDER — CLOPIDOGREL BISULFATE 75 MG/1
75 TABLET ORAL DAILY
Qty: 90 TABLET | Refills: 0 | Status: SHIPPED | OUTPATIENT
Start: 2024-11-06 | End: 2025-02-04

## 2024-11-06 RX ADMIN — HYDROMORPHONE HYDROCHLORIDE 0.4 MG: 1 INJECTION, SOLUTION INTRAMUSCULAR; INTRAVENOUS; SUBCUTANEOUS at 12:20:00

## 2024-11-06 RX ADMIN — IODIXANOL 140 ML: 320 INJECTION, SOLUTION INTRAVASCULAR at 11:58:00

## 2024-11-06 RX ADMIN — HYDRALAZINE HYDROCHLORIDE 10 MG: 20 INJECTION INTRAMUSCULAR; INTRAVENOUS at 12:15:00

## 2024-11-06 NOTE — BRIEF OP NOTE
Pre-Operative Diagnosis: Atherosclerosis with claudication, outflow stenosis of bypass graft right leg     Post-Operative Diagnosis: Atherosclerosis with claudication, outflow stenosis of bypass graft right leg     Procedure Performed:   1. US percutaneous access left common femoral artery   2. Selection of right common femoral artery and angiogram right leg   3. Kissing balloon angioplasty of anterior tibial artery and peroneal artery with 3 mm balloons    Anesthesia: moderate conscious sedation with 4 mg versed and 250 mcg fentanyl, start 1028, finish 1148, total time 80 minutes    Assistant(s):        Surgical Findings:   Complex plaque in anterior tibial artery origin, peroneal, TP trunk treated with angioplasty      Specimen: none      Estimated Blood Loss: 50 mL     Bryon Watts MD  11/6/2024  11:55 AM

## 2024-11-06 NOTE — H&P
Pre-op Diagnosis: bypass graft stenosis     The above referenced H&P was reviewed by Bryon Watts MD on 11/6/2024, the patient was examined and no significant changes have occurred in the patient's condition since the H&P was performed.  I discussed with the patient and/or legal representative the potential benefits, risks and side effects of this procedure; the likelihood of the patient achieving goals; and potential problems that might occur during recuperation.  I discussed reasonable alternatives to the procedure, including risks, benefits and side effects related to the alternatives and risks related to not receiving this procedure.  We will proceed with procedure as planned.

## 2024-11-06 NOTE — DISCHARGE INSTRUCTIONS
Continue aspirin 81 mg   Start clopidogrel 75 daily       HOME CARE INSTRUCTIONS FOLLOWING ANGIOGRAPHY, ANGIOPLASTY (PTCA/PTA) OR INSERTION OF STENT       Activity  DO NOT drive after the procedure.  You may resume driving late the following day according to the nurse or physician's instructions  Plan on resting and relaxing tonight and tomorrow  Resume your normal activity after 48 hours, or as instructed by your physician  Do not lift anything over 10 pounds for 48 hours  Avoid heavy lifting for 1 week  Avoid drinking alcohol for the next 24 hours  If the groin site was used, avoid repeated stair use and excessive walking for the next 24 hours    What is Normal?  A small lump at the procedure site associated with mild tenderness when touched  The procedure site may be bruised or discolored  There may be a small amount of drainage on the bandage    Special Instructions  Drink plenty of fluids during the next 24 hours to \"flush\" the contrast from your system  The bandage is to remain in place for 24 hours  Keep the bandage clean and dry  DO NOT submerge the procedure site for 3 days (no bath tubs or pools)  After 24 hours, you must remove the bandage  You should shower after removing the bandage, and wash the procedure site gently with soap and water  If you choose to wear a bandage for a few days, make sure it remains clean and dry and that it is changed daily    When you should NOTIFY YOUR PHYSICIAN  Bleeding can occur at the procedure site - both on the outside of the skin and/or beneath the surface of the skin  Swelling or a large lump at the procedure site can occur, which may be accompanied by moderate to severe pain    If either of the above occurs, lie down flat.  Have someone apply pressure to the procedure site with both hands, as instructed by the nurse.  Hold pressure for 20 minutes and the bleeding should stop.  Notify your physician of the occurrence  If the bleeding does not stop, call 911 and  continue to apply pressure    If you experience signs of a fever, temperature > 101°, chills, infection (redness, swelling, thick yellow drainage, or a foul odor from the procedure site)  If you notice any numbness, tingling, or loss of feeling to your leg or foot or groin access        Other  You may resume your present diet, unless otherwise specified by your physician.  You may resume all of your medications as prescribed, unless otherwise directed by your physician.  A list of your medications was provided to you at discharge.  Continue the walking program initiated in the hospital and progress your walking as directed.  Or, gradually resume your previous aerobic exercise schedule as tolerated.  Hold metformin/glucophage 48 hours post proceudre.

## 2024-11-06 NOTE — IVS NOTE
Patient discharged home post PV angio. VSS. Femstop removed after 1 hour. Patient received clopidogrel in lab at end of procedure. Duplicate dose not given in holding. New prescription for Plavix filled by [facility]. /74 after procedure. Hydralazine given per . BP decreased to 115/44. After bed rest, patient able to ambulate in hallway. Dressing to left groin clean, dry and intact. AVS reviewed. PIV removed. Discharged via wheelchair with all belongings. Wife driving home.

## 2024-11-06 NOTE — OPERATIVE REPORT
Pre-Operative Diagnosis: Atherosclerosis with claudication, outflow stenosis of bypass graft right leg     Post-Operative Diagnosis: Atherosclerosis with claudication, outflow stenosis of bypass graft right leg     Procedure Performed:   1. US percutaneous access left common femoral artery   2. Selection of right common femoral artery and angiogram right leg   3. Kissing balloon angioplasty of anterior tibial artery and peroneal artery with 3 mm balloons     Anesthesia: moderate conscious sedation with 4 mg versed and 250 mcg fentanyl, start 1028, finish 1148, total time 80 minutes     Assistant(s):        Surgical Findings:   Complex plaque in anterior tibial artery origin, peroneal, TP trunk treated with angioplasty      Specimen: none      Estimated Blood Loss: 50 mL      Brief history: This is a 73-year-old male with significant history of a right femoral artery to popliteal artery bypass graft with vein.  On surveillance testing he has significant outflow stenosis of the TP trunk.  We are proceeding with angiogram to maintain patency of the bypass graft.    Details of procedure: Patient was taken to the Cath Lab and prepped and draped in the usual sterile fashion.  Moderate conscious sedation was initiated monitored by qualified nurse under my supervision.  Using ultrasound percutaneous access left common femoral artery with a micropuncture kit.  I then advanced a Glidewire advantage into the aorta and exchanged the micropuncture sheath for a 5 Macedonian sheath.  I then advanced a crossover catheter and help the aortic bifurcation.  I did an angiogram of the right common iliac and external iliac arteries both of which were widely patent.  I then advanced a Glidewire advantage and crossover catheter level of the common femoral artery and an angiogram of the right leg.  Right common femoral and profunda were patent.  Right SFA was occluded.  Bypass graft was patent and the distal anastomosis was on the distal  popliteal artery.  The TP trunk had a high-grade stenosis.  His anatomy is a variant where the TP trunk is associated also with the origin of the anterior tibial artery.  I advanced the Glidewire advantage 035 into the bypass graft and exchanged the 5 Niuean sheath for a 6 Niuean sheath.  Patient was systemically heparinized.  Over Hadley cross I then exchanged the 035 advantage for a 018 Roadrunner wire.  Using a Hadley cross I advanced the Roadrunner into the anterior tibial artery and then performed angioplasty of the TP trunk with a 3.5 and 4 balloon with suboptimal results.  I balloon angioplastied with a 5 and although the result was optimal the peroneal artery now had a subtotal occlusion at its ostium.  I exchanged over the Hadley cross the 018 wire for the 035.  The 6 Niuean sheath was exchanged for a 7 Niuean by 45 cm sheath.  The 035 advantage was exchanged for a 014 run-through wire.  The 014 run-through wire was in the anterior tibial artery.  I then used an 018 advantage and the Hadley cross to select the peroneal artery.  Using two 3 mm balloons, 1 on the 018 wire and the peroneal artery and the other on the 014 wire and the anterior tibial artery I advanced them in did a simultaneous inflation of the anterior tibial artery and peroneal artery and the distal popliteal artery.  Repeat angiogram showed an excellent technical result with restoration of flow through the peroneal artery.  Imaging further down the leg demonstrated a severe stenosis in the anterior tibial artery and I advanced the 014 wire into the dorsalis pedis.  I then used an additional 3 mm balloon and balloon angioplastied the entirety of the anterior tibial artery.  Repeat angiogram showed an excellent technical result with excellent flow through both the anterior tibial artery that provide inline flow into the dorsalis pedis and the peroneal artery that fills collaterals onto the foot.  I examined the patient's foot he had a easily palpable  dorsalis pedis pulse.    All devices were then removed a Perclose device was placed.  Patient was awakened from anesthesia and taken to recovery in stable condition.

## 2025-02-05 ENCOUNTER — TELEPHONE (OUTPATIENT)
Dept: INTERNAL MEDICINE CLINIC | Facility: CLINIC | Age: 75
End: 2025-02-05

## 2025-02-05 DIAGNOSIS — I10 ESSENTIAL HYPERTENSION: Primary | ICD-10-CM

## 2025-02-05 DIAGNOSIS — Z12.5 PROSTATE CANCER SCREENING: ICD-10-CM

## 2025-02-05 DIAGNOSIS — Z00.00 ENCOUNTER FOR ANNUAL HEALTH EXAMINATION: ICD-10-CM

## 2025-02-05 NOTE — TELEPHONE ENCOUNTER
Patient called request labs prior to their annual physical.  Annual physical scheduled for   Future Appointments   Date Time Provider Department Center   5/14/2025  9:40 AM Avery Rios MD [facility]        Please order labs. Patient preferred lab is [facility]  Patient informed request was sent to clinical team.  Patient informed to fast for labs.  No callback required.

## 2025-05-13 ENCOUNTER — LAB ENCOUNTER (OUTPATIENT)
Dept: LAB | Age: 75
End: 2025-05-13
Attending: INTERNAL MEDICINE
Payer: MEDICARE

## 2025-05-13 DIAGNOSIS — I10 ESSENTIAL HYPERTENSION: ICD-10-CM

## 2025-05-13 DIAGNOSIS — Z00.00 ENCOUNTER FOR ANNUAL HEALTH EXAMINATION: ICD-10-CM

## 2025-05-13 DIAGNOSIS — Z12.5 PROSTATE CANCER SCREENING: ICD-10-CM

## 2025-05-13 LAB
ALBUMIN SERPL-MCNC: 4.2 G/DL (ref 3.2–4.8)
ALBUMIN/GLOB SERPL: 1.3 {RATIO} (ref 1–2)
ALP LIVER SERPL-CCNC: 164 U/L (ref 45–117)
ALT SERPL-CCNC: 15 U/L (ref 10–49)
ANION GAP SERPL CALC-SCNC: 8 MMOL/L (ref 0–18)
AST SERPL-CCNC: 41 U/L (ref ?–34)
BASOPHILS # BLD AUTO: 0.09 X10(3) UL (ref 0–0.2)
BASOPHILS NFR BLD AUTO: 1.2 %
BILIRUB SERPL-MCNC: 0.4 MG/DL (ref 0.2–1.1)
BUN BLD-MCNC: 12 MG/DL (ref 9–23)
CALCIUM BLD-MCNC: 9.1 MG/DL (ref 8.7–10.6)
CHLORIDE SERPL-SCNC: 104 MMOL/L (ref 98–112)
CHOLEST SERPL-MCNC: 103 MG/DL (ref ?–200)
CO2 SERPL-SCNC: 26 MMOL/L (ref 21–32)
COMPLEXED PSA SERPL-MCNC: 1.09 NG/ML (ref ?–4)
CREAT BLD-MCNC: 1.05 MG/DL (ref 0.7–1.3)
EGFRCR SERPLBLD CKD-EPI 2021: 75 ML/MIN/1.73M2 (ref 60–?)
EOSINOPHIL # BLD AUTO: 0.29 X10(3) UL (ref 0–0.7)
EOSINOPHIL NFR BLD AUTO: 3.8 %
ERYTHROCYTE [DISTWIDTH] IN BLOOD BY AUTOMATED COUNT: 15.1 %
FASTING PATIENT LIPID ANSWER: YES
FASTING STATUS PATIENT QL REPORTED: YES
GLOBULIN PLAS-MCNC: 3.3 G/DL (ref 2–3.5)
GLUCOSE BLD-MCNC: 86 MG/DL (ref 70–99)
HCT VFR BLD AUTO: 38.8 % (ref 39–53)
HDLC SERPL-MCNC: 39 MG/DL (ref 40–59)
HGB BLD-MCNC: 12.3 G/DL (ref 13–17.5)
IMM GRANULOCYTES # BLD AUTO: 0.01 X10(3) UL (ref 0–1)
IMM GRANULOCYTES NFR BLD: 0.1 %
LDLC SERPL CALC-MCNC: 42 MG/DL (ref ?–100)
LYMPHOCYTES # BLD AUTO: 3.78 X10(3) UL (ref 1–4)
LYMPHOCYTES NFR BLD AUTO: 49.1 %
MCH RBC QN AUTO: 28.5 PG (ref 26–34)
MCHC RBC AUTO-ENTMCNC: 31.7 G/DL (ref 31–37)
MCV RBC AUTO: 90 FL (ref 80–100)
MONOCYTES # BLD AUTO: 0.61 X10(3) UL (ref 0.1–1)
MONOCYTES NFR BLD AUTO: 7.9 %
NEUTROPHILS # BLD AUTO: 2.92 X10 (3) UL (ref 1.5–7.7)
NEUTROPHILS # BLD AUTO: 2.92 X10(3) UL (ref 1.5–7.7)
NEUTROPHILS NFR BLD AUTO: 37.9 %
NONHDLC SERPL-MCNC: 64 MG/DL (ref ?–130)
OSMOLALITY SERPL CALC.SUM OF ELEC: 285 MOSM/KG (ref 275–295)
PLATELET # BLD AUTO: 304 10(3)UL (ref 150–450)
POTASSIUM SERPL-SCNC: 3.8 MMOL/L (ref 3.5–5.1)
PROT SERPL-MCNC: 7.5 G/DL (ref 5.7–8.2)
RBC # BLD AUTO: 4.31 X10(6)UL (ref 3.8–5.8)
SODIUM SERPL-SCNC: 138 MMOL/L (ref 136–145)
TRIGL SERPL-MCNC: 121 MG/DL (ref 30–149)
TSI SER-ACNC: 0.63 UIU/ML (ref 0.55–4.78)
VLDLC SERPL CALC-MCNC: 17 MG/DL (ref 0–30)
WBC # BLD AUTO: 7.7 X10(3) UL (ref 4–11)

## 2025-05-13 PROCEDURE — 85025 COMPLETE CBC W/AUTO DIFF WBC: CPT

## 2025-05-13 PROCEDURE — 80053 COMPREHEN METABOLIC PANEL: CPT

## 2025-05-13 PROCEDURE — 84443 ASSAY THYROID STIM HORMONE: CPT

## 2025-05-13 PROCEDURE — 36415 COLL VENOUS BLD VENIPUNCTURE: CPT

## 2025-05-13 PROCEDURE — 80061 LIPID PANEL: CPT

## 2025-05-14 ENCOUNTER — OFFICE VISIT (OUTPATIENT)
Dept: INTERNAL MEDICINE CLINIC | Facility: CLINIC | Age: 75
End: 2025-05-14
Payer: MEDICARE

## 2025-05-14 DIAGNOSIS — I73.9 PAD (PERIPHERAL ARTERY DISEASE): ICD-10-CM

## 2025-05-14 DIAGNOSIS — I49.3 PVC (PREMATURE VENTRICULAR CONTRACTION): ICD-10-CM

## 2025-05-14 DIAGNOSIS — Z95.828 STATUS POST FEMORAL-POPLITEAL BYPASS SURGERY: ICD-10-CM

## 2025-05-14 DIAGNOSIS — I10 ESSENTIAL HYPERTENSION: ICD-10-CM

## 2025-05-14 DIAGNOSIS — F17.200 TOBACCO DEPENDENCE: ICD-10-CM

## 2025-05-14 DIAGNOSIS — R00.9 ABNORMALITY OF HEART BEAT: ICD-10-CM

## 2025-05-14 DIAGNOSIS — Z00.00 ENCOUNTER FOR ANNUAL HEALTH EXAMINATION: Primary | ICD-10-CM

## 2025-05-14 DIAGNOSIS — I70.239 ATHEROSCLEROSIS OF NATIVE ARTERY OF RIGHT LOWER EXTREMITY WITH ULCERATION, UNSPECIFIED ULCERATION SITE (HCC): ICD-10-CM

## 2025-05-14 LAB
ATRIAL RATE: 49 BPM
P AXIS: 62 DEGREES
P-R INTERVAL: 146 MS
Q-T INTERVAL: 450 MS
QRS DURATION: 100 MS
QTC CALCULATION (BEZET): 438 MS
R AXIS: 1 DEGREES
T AXIS: -54 DEGREES
VENTRICULAR RATE: 57 BPM

## 2025-05-14 PROCEDURE — 99214 OFFICE O/P EST MOD 30 MIN: CPT | Performed by: INTERNAL MEDICINE

## 2025-05-14 PROCEDURE — 1159F MED LIST DOCD IN RCRD: CPT | Performed by: INTERNAL MEDICINE

## 2025-05-14 PROCEDURE — 1160F RVW MEDS BY RX/DR IN RCRD: CPT | Performed by: INTERNAL MEDICINE

## 2025-05-14 PROCEDURE — 96160 PT-FOCUSED HLTH RISK ASSMT: CPT | Performed by: INTERNAL MEDICINE

## 2025-05-14 PROCEDURE — 93000 ELECTROCARDIOGRAM COMPLETE: CPT | Performed by: INTERNAL MEDICINE

## 2025-05-14 PROCEDURE — G0439 PPPS, SUBSEQ VISIT: HCPCS | Performed by: INTERNAL MEDICINE

## 2025-05-14 PROCEDURE — 1126F AMNT PAIN NOTED NONE PRSNT: CPT | Performed by: INTERNAL MEDICINE

## 2025-05-14 PROCEDURE — 1170F FXNL STATUS ASSESSED: CPT | Performed by: INTERNAL MEDICINE

## 2025-05-14 NOTE — PROGRESS NOTES
Subjective:   Lawrence Patel is a 74 year old male who presents for a MA AHA (Medicare Advantage Annual Health Assessment) and Subsequent Annual Wellness visit (Pt already had Initial Annual Wellness) and scheduled follow up of multiple significant but stable problems.   History of Present Illness    Since last evaluation patient has overall maintained his usual state of health.  He denies any acute concerns at this time.  He reports no significant symptoms of pain involving the lower extremities, even with ambulation for a longer distance.  He has maintained compliance with atorvastatin 40 mg and aspirin 81 mg daily for peripheral artery disease with current LDL cholesterol at 42. Alkaline phosphatase level has improved significantly from prior study in November 2024 currently at 164 with AST of 41.  Hemoglobin is 12.3.    History/Other:   Fall Risk Assessment:   [unfilled]     Cognitive Assessment:   He had a completely normal cognitive assessment - see flowsheet entries     Functional Ability/Status:   Lawrence Patel has a completely normal functional assessment. See flowsheet for details.      Depression Screening (PHQ):  PHQ-2 SCORE: 0  , done 5/13/2025        5 minutes spent screening and counseling for depression    Advanced Directives:   He does NOT have a Living Will. [Do you have a living will?: No]  He does NOT have a Power of  for Health Care. [Do you have a healthcare power of ?: (Patient-Rptd) No]  Discussed Advance Care Planning with patient (and family/surrogate if present). Standard forms made available to patient in After Visit Summary.      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    Current Medications:  Outpatient Medications Marked as Taking for the 5/14/25  encounter (Office Visit) with Avery Rios MD   Medication Sig    atorvastatin 40 MG Oral Tab Take 1 tablet (40 mg total) by mouth nightly.    losartan 25 MG Oral Tab Take 1 tablet (25 mg total) by mouth daily.    aspirin 81 MG Oral Tab EC Take 1 tablet (81 mg total) by mouth daily.    Multiple Vitamin (DAILY VITAMIN) Oral Tab Take by mouth.       Medical History:  He  has a past medical history of IBS (irritable bowel syndrome) and Peripheral vascular disease.  Surgical History:  He  has a past surgical history that includes other (1997) and colonoscopy (N/A, 07/16/2021).   Family History:  His family history includes Heart Disease in his father and mother; Prostate Cancer in his father.  Social History:  He  reports that he has been smoking cigarettes. He has a 6.5 pack-year smoking history. He has never used smokeless tobacco. He reports current alcohol use of about 2.0 standard drinks of alcohol per week. He reports current drug use. Drug: Cannabis.    Tobacco:  Social History     Tobacco Use   Smoking Status Every Day    Current packs/day: 0.25    Average packs/day: 0.3 packs/day for 26.0 years (6.5 ttl pk-yrs)    Types: Cigarettes   Smokeless Tobacco Never     E-Cigarettes/Vaping       Questions Responses    E-Cigarette Use Never User           Tobacco cessation counseling for 3-10 minutes (add E/M code #96054).      CAGE Alcohol Screen:   CAGE screening score of 0 on 5/14/2025, showing low risk of alcohol abuse.      Patient Care Team:  Avery Rios MD as PCP - General (Internal Medicine)  Josué Akers MD (GASTROENTEROLOGY)  Jaqueline Harrington PA-C (Physician Assistant)    Review of Systems  GENERAL: feels well otherwise  SKIN: denies any unusual skin lesions  EYES: denies blurred vision or double vision  HEENT: denies nasal congestion, sinus pain or ST  LUNGS: denies shortness of breath with exertion  CARDIOVASCULAR: denies chest pain on exertion  GI: denies abdominal pain, denies heartburn  : 0  per night nocturia, no complaint of urinary incontinence  MUSCULOSKELETAL: denies back pain  NEURO: denies headaches  PSYCHE: denies depression or anxiety  HEMATOLOGIC: denies hx of anemia  ENDOCRINE: denies thyroid history  ALL/ASTHMA: denies hx of allergy or asthma    Objective:   Physical Exam  General Appearance:  Alert, cooperative, no distress, appears stated age   Head:  Normocephalic, without obvious abnormality, atraumatic   Eyes:  Bilateral conjunctiva within normal limits   Ears:  Tympanic membrane within normal limits bilaterally   Nose: Deferred   Throat: Deferred   Neck: Supple, symmetrical, trachea midline, no adenopathy, thyroid: not enlarged, symmetric, no tenderness/mass/nodules, no carotid bruit or JVD   Back:   Symmetric, no curvature, ROM normal, no CVA tenderness   Lungs:   Clear to auscultation bilaterally, respirations unlabored   Chest Wall:  No tenderness or deformity   Regular rate Regular rate.  Intermittent pauses.  No murmur.   Abdomen:   Soft, non-tender, bowel sounds active all four quadrants,  no masses, no organomegaly   Genitalia: Deferred   Rectal: Deferred   Extremities: No edema   Pulses: 2+ and symmetric   Skin: Skin color, texture, turgor normal, no rashes or lesions   Lymph nodes: Cervical nodes normal   Neurologic: Grossly normal     There were no vitals taken for this visit. Estimated body mass index is 18.75 kg/m² as calculated from the following:    Height as of 11/4/24: 5' 9\" (1.753 m).    Weight as of 11/4/24: 127 lb (57.6 kg).    Medicare Hearing Assessment:   Hearing Screening    Time taken: 5/14/2025 10:19 AM  Entry User: Avery Rios MD  Screening Method: Whisper Test  Whisper Test Result: Pass         Visual Acuity:   Right Eye Visual Acuity: Corrected Right Eye Chart Acuity: 20/30   Left Eye Visual Acuity: Corrected Left Eye Chart Acuity: 20/30   Both Eyes Visual Acuity: Corrected Both Eyes Chart Acuity: 20/25   Able To Tolerate Visual Acuity: Yes         Assessment & Plan:   Lawrence Patel is a 74 year old male who presents for a Medicare Assessment.     1. Abnormality of heart beat (Primary)  -     EKG with interpretation and Report -IN OFFICE [27368]  2. Encounter for annual health examination    Assessment & Plan    Outstanding screening and preventive measures:  Pneumococcal immunization: Declined    Abnormal heart sounds:  Intermittent lightheadedness and palpitations, unprovoked  ECG: Normal sinus rhythm with ventricular rate of 57 bpm, QRS duration of 100 ms, QTc of 438 ms, and AL interval of 146 ms.  PVCs are not seen.  There is a T wave abnormality seen in prior study.  In light of frequency of PVCs and symptoms I have referred the patient to the cardiology service    Tobacco dependence:  Current use is 12 cigarettes daily  Declined desire and resources to achieve cessation     Pure hypercholesterolemia and PAD:  PAD of bilateral lower extremities.  Post revascularization.  Following with vascular surgery service  LDL at goal  Continue atorvastatin 40 mg daily     Hypertension:  Stable and controlled  Continue losartan 25 mg daily    The patient indicates understanding of these issues and agrees to the plan.        Return in 6 months (on 11/14/2025).     Avery Rios MD, 5/14/2025     Supplementary Documentation:   General Health:  In the past six months, have you lost more than 10 pounds without trying?: (Patient-Rptd) 2 - No  Has your appetite been poor?: (Patient-Rptd) No  How does the patient maintain a good energy level?: (Patient-Rptd) Daily Walks  How would you describe your daily physical activity?: (Patient-Rptd) Light  How would you describe your current health state?: (Patient-Rptd) Good  How do you maintain positive mental well-being?: (Patient-Rptd) Social Interaction  On a scale of 0 to 10, with 0 being no pain and 10 being severe pain, what is your pain level?: (Patient-Rptd) 0 - (None)  In the past six months, have you experienced  urine leakage?: (Patient-Rptd) 0-No  At any time do you feel concerned for the safety/well-being of yourself and/or your children, in your home or elsewhere?: (Patient-Rptd) No  Have you had any immunizations at another office such as Influenza, Hepatitis B, Tetanus, or Pneumococcal?: (Patient-Rptd) No    Health Maintenance   Topic Date Due    Pneumococcal Vaccine: 50+ Years (2 of 2 - PCV) 05/19/2022    COVID-19 Vaccine (5 - 2024-25 season) 09/01/2024    Annual Well Visit  01/01/2025    Tobacco Cessation Counseling  01/01/2025    Zoster Vaccines (1 of 2) 05/28/2025 (Originally 12/18/2001)    Influenza Vaccine (Season Ended) 10/01/2025    PSA  05/13/2027    Colorectal Cancer Screening  07/16/2031    Annual Depression Screening  Completed    Fall Risk Screening (Annual)  Completed    Meningococcal B Vaccine  Aged Out

## 2025-05-21 RX ORDER — ATORVASTATIN CALCIUM 40 MG/1
40 TABLET, FILM COATED ORAL NIGHTLY
Qty: 90 TABLET | Refills: 3 | Status: SHIPPED | OUTPATIENT
Start: 2025-05-21

## 2025-05-21 RX ORDER — LOSARTAN POTASSIUM 25 MG/1
25 TABLET ORAL DAILY
Qty: 90 TABLET | Refills: 3 | Status: SHIPPED | OUTPATIENT
Start: 2025-05-21

## 2025-05-21 NOTE — TELEPHONE ENCOUNTER
LOV/annual 5/14/25  Last refill 6/3/24  No future appointments.  Passed protocol. Refills approved.

## (undated) DEVICE — 3M™ IOBAN™ 2 ANTIMICROBIAL INCISE DRAPE 6650EZ: Brand: IOBAN™ 2

## (undated) DEVICE — SOL NACL IRRIG 0.9% 1000ML BTL

## (undated) DEVICE — FLOSEAL SEALENT STERILE 10ML

## (undated) DEVICE — PTA BALLOON DILATATION CATHETER: Brand: MUSTANG™

## (undated) DEVICE — 450 ML BOTTLE OF 0.05% CHLORHEXIDINE GLUCONATE IN 99.95% STERILE WATER FOR IRRIGATION, USP AND APPLICATOR.: Brand: IRRISEPT ANTIMICROBIAL WOUND LAVAGE

## (undated) DEVICE — STANDARD HYPODERMIC NEEDLE,POLYPROPYLENE HUB: Brand: MONOJECT

## (undated) DEVICE — Device: Brand: DEFENDO AIR/WATER/SUCTION AND BIOPSY VALVE

## (undated) DEVICE — SUT SILK 3-0 A304H

## (undated) DEVICE — SUT VICRYL 3-0 SH J864D

## (undated) DEVICE — RADIFOCUS GLIDEWIRE ADVANTAGE GUIDEWIRE: Brand: GLIDEWIRE ADVANTAGE

## (undated) DEVICE — STERILE POLYISOPRENE POWDER-FREE SURGICAL GLOVES: Brand: PROTEXIS

## (undated) DEVICE — 3M™ IOBAN™ 2 ANTIMICROBIAL INCISE DRAPE 6651EZ: Brand: IOBAN™ 2

## (undated) DEVICE — SUT PROLENE 6-0 C-1 8726H

## (undated) DEVICE — 1200CC GUARDIAN II: Brand: GUARDIAN

## (undated) DEVICE — KIT ANGIOPLASTY PLUS Y-ADAPTER

## (undated) DEVICE — BANDAGE COMP PREMPRO 5YDX4IN

## (undated) DEVICE — FORCEP BIOPSY RJ4 LG CAP W/ND

## (undated) DEVICE — CV PACK-LF: Brand: MEDLINE INDUSTRIES, INC.

## (undated) DEVICE — SUT PROLENE 6-0 C-1 8706H

## (undated) DEVICE — DERMABOND CLOSURE 0.7ML TOPICL

## (undated) DEVICE — SUT PROLENE 5-0 C-1 8725H

## (undated) DEVICE — TOWEL SURG OR 17X30IN BLUE

## (undated) DEVICE — ANGIO PACK-LF: Brand: MEDLINE INDUSTRIES, INC.

## (undated) DEVICE — Device: Brand: INTELLICART™

## (undated) DEVICE — ENDOSCOPY PACK - LOWER: Brand: MEDLINE INDUSTRIES, INC.

## (undated) DEVICE — SHEATH MICROPUNCTURE STIFF

## (undated) DEVICE — MEDI-VAC SUCTION HANDLE REGULAR CAPACITY: Brand: CARDINAL HEALTH

## (undated) DEVICE — 1.5MM HYDRO LEMAITRE VALVULOTOME (98 CM): Brand: HYDRO LEMAITRE VALVULOTOME

## (undated) DEVICE — CLIP INTERNAL HORIZON SMALL

## (undated) DEVICE — SUT PROLENE 6-0 C-1 M8726

## (undated) DEVICE — INTENDED TO BE USED TO OCCLUDE, RETRACT AND IDENTIFY ARTERIES, VEINS, TENDONS AND NERVES IN SURGICAL PROCEDURES: Brand: STERION®  VESSEL LOOP

## (undated) DEVICE — PINNACLE INTRODUCER SHEATH: Brand: PINNACLE

## (undated) DEVICE — FILTERLINE NASAL ADULT O2/CO2

## (undated) DEVICE — 3M™ BAIR HUGGER® UNDERBODY BLANKET, FULL ACCESS, 10 PER CASE 63500: Brand: BAIR HUGGER™

## (undated) DEVICE — SUT PROLENE 4-0 V-5 8935H

## (undated) DEVICE — SUT SILK 3-0 SH C013D

## (undated) DEVICE — SUT PROLENE 5-0 C-1 8720H

## (undated) DEVICE — GAUZE SPONGES,12 PLY: Brand: CURITY

## (undated) DEVICE — TRAY SURESTEP 16 BARDEX UMETR

## (undated) DEVICE — SUT VICRYL 2-0 CT-1 J945H

## (undated) DEVICE — BANDAGE ROLL,100% COTTON, 6 PLY, LARGE: Brand: KERLIX

## (undated) DEVICE — SOLUTION  0.9% 500ML

## (undated) DEVICE — MEDI-VAC NON-CONDUCTIVE SUCTION TUBING: Brand: CARDINAL HEALTH

## (undated) DEVICE — GEL AQUASONIC 100 20GR

## (undated) DEVICE — APPLICATOR CHLORAPREP 26ML

## (undated) NOTE — LETTER
06/18/21        [address]  [place] [address]      Dear Heladio Dickens,    [address] [facility] records indicate that you have outstanding lab work and or testing that was ordered for you and has not yet been completed:  Orders Placed This E